# Patient Record
Sex: FEMALE | Race: BLACK OR AFRICAN AMERICAN | NOT HISPANIC OR LATINO | Employment: FULL TIME | URBAN - METROPOLITAN AREA
[De-identification: names, ages, dates, MRNs, and addresses within clinical notes are randomized per-mention and may not be internally consistent; named-entity substitution may affect disease eponyms.]

---

## 2024-11-07 ENCOUNTER — PATIENT MESSAGE (OUTPATIENT)
Dept: FAMILY MEDICINE | Facility: CLINIC | Age: 24
End: 2024-11-07

## 2024-11-07 ENCOUNTER — OFFICE VISIT (OUTPATIENT)
Dept: FAMILY MEDICINE | Facility: CLINIC | Age: 24
End: 2024-11-07
Payer: COMMERCIAL

## 2024-11-07 ENCOUNTER — PATIENT MESSAGE (OUTPATIENT)
Dept: PSYCHIATRY | Facility: CLINIC | Age: 24
End: 2024-11-07
Payer: COMMERCIAL

## 2024-11-07 ENCOUNTER — PATIENT OUTREACH (OUTPATIENT)
Dept: PSYCHIATRY | Facility: CLINIC | Age: 24
End: 2024-11-07
Payer: COMMERCIAL

## 2024-11-07 VITALS
BODY MASS INDEX: 28.12 KG/M2 | SYSTOLIC BLOOD PRESSURE: 112 MMHG | OXYGEN SATURATION: 97 % | HEART RATE: 97 BPM | WEIGHT: 164.69 LBS | DIASTOLIC BLOOD PRESSURE: 78 MMHG | HEIGHT: 64 IN

## 2024-11-07 DIAGNOSIS — Z86.59 HISTORY OF ADHD: ICD-10-CM

## 2024-11-07 DIAGNOSIS — G89.29 CHRONIC INTRACTABLE HEADACHE, UNSPECIFIED HEADACHE TYPE: ICD-10-CM

## 2024-11-07 DIAGNOSIS — K76.0 FATTY LIVER: ICD-10-CM

## 2024-11-07 DIAGNOSIS — D50.9 MICROCYTIC ANEMIA: ICD-10-CM

## 2024-11-07 DIAGNOSIS — R51.9 CHRONIC INTRACTABLE HEADACHE, UNSPECIFIED HEADACHE TYPE: ICD-10-CM

## 2024-11-07 DIAGNOSIS — Z13.220 SCREENING FOR LIPID DISORDERS: ICD-10-CM

## 2024-11-07 DIAGNOSIS — R82.90 ABNORMAL URINALYSIS: ICD-10-CM

## 2024-11-07 DIAGNOSIS — N92.6 IRREGULAR MENSTRUAL CYCLE: ICD-10-CM

## 2024-11-07 DIAGNOSIS — Z76.89 ENCOUNTER TO ESTABLISH CARE WITH NEW DOCTOR: Primary | ICD-10-CM

## 2024-11-07 DIAGNOSIS — E66.3 OVERWEIGHT WITH BODY MASS INDEX (BMI) OF 28 TO 28.9 IN ADULT: ICD-10-CM

## 2024-11-07 LAB
BILIRUB UR QL STRIP: NEGATIVE
CLARITY UR: CLEAR
COLOR UR: YELLOW
GLUCOSE UR QL STRIP: NEGATIVE
HGB UR QL STRIP: NEGATIVE
KETONES UR QL STRIP: NEGATIVE
LEUKOCYTE ESTERASE UR QL STRIP: NEGATIVE
NITRITE UR QL STRIP: NEGATIVE
PH UR STRIP: 8 [PH] (ref 5–8)
PROT UR QL STRIP: NEGATIVE
SP GR UR STRIP: 1.02 (ref 1–1.03)
URN SPEC COLLECT METH UR: NORMAL

## 2024-11-07 PROCEDURE — 3008F BODY MASS INDEX DOCD: CPT | Mod: CPTII,S$GLB,, | Performed by: STUDENT IN AN ORGANIZED HEALTH CARE EDUCATION/TRAINING PROGRAM

## 2024-11-07 PROCEDURE — 81003 URINALYSIS AUTO W/O SCOPE: CPT | Mod: PO | Performed by: STUDENT IN AN ORGANIZED HEALTH CARE EDUCATION/TRAINING PROGRAM

## 2024-11-07 PROCEDURE — 99999 PR PBB SHADOW E&M-EST. PATIENT-LVL IV: CPT | Mod: PBBFAC,,, | Performed by: STUDENT IN AN ORGANIZED HEALTH CARE EDUCATION/TRAINING PROGRAM

## 2024-11-07 PROCEDURE — 1159F MED LIST DOCD IN RCRD: CPT | Mod: CPTII,S$GLB,, | Performed by: STUDENT IN AN ORGANIZED HEALTH CARE EDUCATION/TRAINING PROGRAM

## 2024-11-07 PROCEDURE — 1160F RVW MEDS BY RX/DR IN RCRD: CPT | Mod: CPTII,S$GLB,, | Performed by: STUDENT IN AN ORGANIZED HEALTH CARE EDUCATION/TRAINING PROGRAM

## 2024-11-07 PROCEDURE — 99204 OFFICE O/P NEW MOD 45 MIN: CPT | Mod: S$GLB,,, | Performed by: STUDENT IN AN ORGANIZED HEALTH CARE EDUCATION/TRAINING PROGRAM

## 2024-11-07 PROCEDURE — 3078F DIAST BP <80 MM HG: CPT | Mod: CPTII,S$GLB,, | Performed by: STUDENT IN AN ORGANIZED HEALTH CARE EDUCATION/TRAINING PROGRAM

## 2024-11-07 PROCEDURE — 3074F SYST BP LT 130 MM HG: CPT | Mod: CPTII,S$GLB,, | Performed by: STUDENT IN AN ORGANIZED HEALTH CARE EDUCATION/TRAINING PROGRAM

## 2024-11-07 RX ORDER — AMOXICILLIN AND CLAVULANATE POTASSIUM 875; 125 MG/1; MG/1
1 TABLET, FILM COATED ORAL 2 TIMES DAILY
COMMUNITY
Start: 2024-10-23

## 2024-11-07 NOTE — PROGRESS NOTES
"Patient ID: Mela Monae is a 24 y.o. female.    Chief Complaint: Annual Exam    History of Present Illness    CHIEF COMPLAINT:  24 year old female presents to [facility] care. She is new to me and new to this clinic. Her main concerns today are weight loss difficulties, ADHD symptoms, and chronic headaches.    WEIGHT MANAGEMENT:  She reports difficulty losing weight despite maintaining a healthy lifestyle as a , including regular exercise and healthy eating habits. She expresses frustration with her inability to lose weight and uncertainty about the cause. She feels heavy and notes that her clothes, particularly jeans, are fitting tightly. She compares her current experience to her postpartum weight loss after her first child, indicating she was previously able to regain her pre-pregnancy figure through exercise. Her goal weight is 150 pounds. She has resorted to purchasing new clothes due to the tight fit of her existing wardrobe. She works as a . She has tried four different diets but not the Mediterranean diet.    ADHD:  She reports a history of ADHD symptoms, initially evaluated in high school. At that time, she was able to maintain academic performance and chose to cope without intervention. However, she now experiences increasing difficulty managing symptoms, particularly with the added responsibilities of children and ongoing college education. She describes persistent irritability and racing thoughts, characterizing her mental state as "hyperdrive" with constant mental "noise." She reports difficulty with task initiation, noting that when overwhelmed with thoughts, she becomes immobilized and unproductive for extended periods, unable to prioritize or organize her thoughts into action.    CHRONIC HEADACHES:  She reports chronic daily headaches that began approximately nine months ago, following the birth of her child. She experiences headaches throughout the entire day, with " notable occurrences while driving, at home, and at night. She had a previous MRI, which came back clear. She notes that the headaches are worse at specific times, particularly when driving long distances. For treatment, she takes Tylenol 1000 mg every 3-4 days as needed, avoiding frequent use. She previously used ibuprofen but discontinued due to concerns about withdrawal headaches. She reports that caffeine helps alleviate her headaches but usually does not have coffee more often than a cup twice weekly. She denies any significant changes in vision associated with the headaches. She mentions occasional weakness in her hands when screwing bottle tops.    MENSTRUAL ISSUES:  She reports experiencing heavy menstrual bleeding since giving birth 9 months ago. Her cycle is coming 4 days late and is accompanied by severe cramping. She states that this is different from her experience after her first child, when her cycle returned to normal after two months. She is not currently breastfeeding. She follows with her OBGYN for this regularly, Dr. Bowen.    ANEMIA:  She reports a history of severe anemia since childhood. She does not tolerate oral iron supplements well, experiencing vomiting. She underwent intravenous iron treatment, completing two sessions. The last iron treatment was administered in March or April. She indicates that the IV iron therapy was effective in improving her anemia. I see that iron studies are ordered already by Dr. Bowen.    FAMILY HISTORY:  She reports her sister has type 2 diabetes. Her mother is borderline type 2 diabetes and had a full hysterectomy at age 36. Grandmother has high blood pressure and high cholesterol, while her grandfather has diabetes. She denies any family history of breast, colon, or ovarian cancer.    SOCIAL HISTORY:  She is recently  and lives with her  and two children in [place]. 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 She reports consuming alcohol in moderation, typically two glasses of wine per week.    ROS: as above          Vitals:    11/07/24 1320   BP: 112/78   Pulse: 97   SpO2: 97%   Weight: 74.7 kg (164 lb 10.9 oz)   Height: 5' 4" (1.626 m)     Body mass index is 28.27 kg/m².     Physical Exam  HENT:      Right Ear: Tympanic membrane and external ear normal.      Left Ear: Tympanic membrane and external ear normal.      Mouth/Throat:      Mouth: Mucous membranes are moist.      Pharynx: Oropharynx is clear.   Eyes:      Conjunctiva/sclera: Conjunctivae normal.      Pupils: Pupils are equal, round, and reactive to light.   Cardiovascular:      Rate and Rhythm: Normal rate and regular rhythm.      Heart sounds: Normal heart sounds.   Pulmonary:      Effort: Pulmonary effort is normal.      Breath sounds: Normal breath sounds.   Abdominal:      General: Bowel sounds are normal.      Palpations: Abdomen is soft. There is no mass.      Tenderness: There is no abdominal tenderness.   Lymphadenopathy:      Cervical: No cervical adenopathy.   Skin:     General: Skin is warm and dry.      Findings: No lesion.   Neurological:      General: No focal deficit present.      Mental Status: She is alert.      Cranial Nerves: No cranial nerve deficit.      Motor: No weakness.      Comments: No pronator drift. No dysdiadochokinesia. No nystagmus.   Psychiatric:         Mood and Affect: Mood normal.         Behavior: Behavior normal.         Assessment & Plan    Assessed patient's persistent headaches  Evaluated anemia history, noting recent iron studies ordered by another provider  Reviewed recent liver enzymes, which were normal despite previous fatty liver impression on ultrasound imaging during pregnancy  Considered ADHD evaluation based on patient's reported symptoms and history  Considered weight loss options, including GLP-1 agonists, but deferred decision pending further discussion    1. Encounter to establish " care with new doctor    2. Overweight with body mass index (BMI) of 28 to 28.9 in adult  - Recommend 150 minutes of moderate aerobic activity weekly or 75 minutes of vigorous.  - Mediterranean diet recommended and educational print out given.  - Will hold on referral to dietician in this patient who works as a  and nutritionist.  - Provided information on GLP-1 agonists for weight loss, mentioning animal studies showing association with thyroid cancer but no known association in humans.  - Scheduled follow up in 1-2 weeks to discuss weight loss medication options.    3. History of ADHD  - Ambulatory referral/consult to Psychiatry; Future  - If medication is indicated, stimulant medicines may promote appetite suppression.    4. Chronic intractable headache, unspecified headache type  - Ambulatory referral/consult to Neurology; Future  - Has had normal MRI. Denies worsening with lying down, coughing, having a bowel movement.  - Only has caffeine twice weekly.  - Denies snoring.  - Admits driving as a trigger but she just had her eye glasses prescription updated with new astigmatism in the right eye (been chronic in the left eye since high school).  - Admits frequent nausea not necessarily always with a headache.  - Admits feeling like decreased hearing on the left side. Hearing grossly normal on exam. Bilateral canals without cerumen.    5. Fatty liver  - Most recent liver enzymes within normal.  - Ultrasound performed during most recent pregnancy checking for gallstones.    6. Abnormal urinalysis  - Urinalysis  - Repeat when not on period to check that blood has cleared.    7. Microcytic anemia  - CBC Without Differential; Future  - Iron studies already ordered by Dr. Bowen.  - Reports status post iron infusion back in March 2024.    8. Screening for lipid disorders  - LIPID PANEL; Future  - To be collected at the same time she has iron studies drawn to [facility].    9. Irregular menstrual cycle  -  Continue to follow up with Dr. Bowen - work up initiated with ultrasound and iron studies seen ordered in the system.    LABS:  - Ordered urinalysis.    FOLLOW UP:  - Follow up in 1-2 weeks to discuss test results, weight loss.  - Contact the office within 1 week if not scheduled for psychiatry and neurology referrals.        Follow up in about 2 weeks (around 11/21/2024).    This note was generated with the assistance of ambient listening technology. Verbal consent was obtained by the patient and accompanying visitor(s) for the recording of patient appointment to facilitate this note. I attest to having reviewed and edited the generated note for accuracy, though some syntax or spelling errors may persist. Please contact the author of this note for any clarification.

## 2024-11-08 ENCOUNTER — TELEPHONE (OUTPATIENT)
Dept: FAMILY MEDICINE | Facility: CLINIC | Age: 24
End: 2024-11-08
Payer: COMMERCIAL

## 2024-11-08 ENCOUNTER — OFFICE VISIT (OUTPATIENT)
Dept: NEUROLOGY | Facility: CLINIC | Age: 24
End: 2024-11-08
Payer: COMMERCIAL

## 2024-11-08 VITALS
RESPIRATION RATE: 17 BRPM | HEIGHT: 64 IN | WEIGHT: 165.38 LBS | TEMPERATURE: 99 F | SYSTOLIC BLOOD PRESSURE: 128 MMHG | BODY MASS INDEX: 28.24 KG/M2 | DIASTOLIC BLOOD PRESSURE: 80 MMHG | HEART RATE: 72 BPM

## 2024-11-08 DIAGNOSIS — G43.709 CHRONIC MIGRAINE WITHOUT AURA WITHOUT STATUS MIGRAINOSUS, NOT INTRACTABLE: Primary | ICD-10-CM

## 2024-11-08 DIAGNOSIS — G89.29 CHRONIC INTRACTABLE HEADACHE, UNSPECIFIED HEADACHE TYPE: ICD-10-CM

## 2024-11-08 DIAGNOSIS — R51.9 CHRONIC INTRACTABLE HEADACHE, UNSPECIFIED HEADACHE TYPE: ICD-10-CM

## 2024-11-08 PROCEDURE — 99999 PR PBB SHADOW E&M-EST. PATIENT-LVL IV: CPT | Mod: PBBFAC,,, | Performed by: NURSE PRACTITIONER

## 2024-11-08 RX ORDER — PROPRANOLOL HYDROCHLORIDE 10 MG/1
10 TABLET ORAL 2 TIMES DAILY
Qty: 60 TABLET | Refills: 11 | Status: SHIPPED | OUTPATIENT
Start: 2024-11-08 | End: 2025-11-08

## 2024-11-08 RX ORDER — SUMATRIPTAN 50 MG/1
50 TABLET, FILM COATED ORAL
Qty: 10 TABLET | Refills: 11 | Status: SHIPPED | OUTPATIENT
Start: 2024-11-08 | End: 2024-12-08

## 2024-11-08 NOTE — TELEPHONE ENCOUNTER
----- Message from Lilia Marie DO sent at 11/7/2024  5:18 PM CST -----  Patient asked have her name changed in the portal to her new  name. I told her to show the  her ID, but I am not sure if she did. Her new  name is Marianela. Please update her record. Thank you.

## 2024-11-08 NOTE — PROGRESS NOTES
Date of service: 11/8/2024  Referring provider: Dr. Lilia Marie    Subjective:      Chief complaint: Headache       Patient ID: Mela Monae is a 24 y.o. who presents today as a new patient for headache.     History of Present Illness  ORIGINAL HEADACHE HISTORY -   Age at onset and course over time: [age] intermittently prior to pregnancy of her first child. After delivery of second child (01/29/2024) headaches increased in frequency, duration and intensity. Today, she reports daily headache with escalation to migraine roughly 4 times per week. She is typically unable to drive due to frequency of migraine.     Location: frontal, temporal   Quality:  [] Stabbing [x] Pressure [x] Tight [x] Throbbing/pounding [] Sharp    Duration: [] Seconds [] Minutes [x] Hours [] Days [] Constant   Frequency: [x] Daily [] Weekly [] Monthly   How many days per month is your head or neck 100% pain free: 0  Headaches awaken at night?:     Worst time of day: mid-day, evening, overnight   Intensity of pain: at best 2/10, at worst 10/10   Associated with: [x] Photophobia []  Phonophobia [] Osmophobia [] Loss of appetite [x] Nausea [] Vomiting   [x] Dizziness [x] Vertigo [x] Ringing in the ears [] Blurry vision [] Double vision  [x] Anxiety/Anger/Irritability [x] Problems with concentration [x] Problems with memory [x] Problems with task completion   [x] Problems with relaxation [] Neck tightness/ neck pain [] Nasal congestion [x] Nasal or sinus pressure [x] Aura   Alleviated by:  [x] Sleep [x] Darkness [] Local pressure [] Massage [] Heat [] Ice [] Menses [x] Medication  Exacerbated by:  [x] Fatigue [x] Light [x] Noise [] Smells [] Coughing [x] Sneezing  [] Bending over [] Change in weather [] Ovulation [x] Menses [x] Alcohol [x] Stress []  Food  Ipsilateral autonomic: [] nasal congestion [] lacrimation [] ptosis [] injection [] edema [] foreign body sensation [] ear fullness   ICP:  [] transient visual obscurations  [x] tinnitus -  low pitched, steady, left ear   [] positional headache  [] non-positional     Bowl Habits: [] Normal [] Constipation [] Diarrhea   Caffeine intake: 70 mg, 3-4 days per week   Sleep habits: un-refreshed sleep   Water intake: 3 bottles per day    Eye Exam: up to date   Family history of migraine: sister   Gyn status (if female) (birth control with estrogen, hysterectomy): having periods   History of asthma, cancer, glaucoma, kidney stones, CVA and osteoporosis: none     HIT 6: 74    Current acute treatment:  Ibuprofen   Tylenol     Current prevention:  None     Previously tried/failed acute treatment:  None     Previously tried/failed preventative treatment:  None     Considerations:     Review of patient's allergies indicates:  No Known Allergies  Current Outpatient Medications   Medication Sig Dispense Refill    ibuprofen (ADVIL,MOTRIN) 800 MG tablet Take 1 tablet (800 mg total) by mouth every 8 (eight) hours as needed for Pain. 30 tablet 2    amoxicillin-clavulanate 875-125mg (AUGMENTIN) 875-125 mg per tablet Take 1 tablet by mouth 2 (two) times daily. (Patient not taking: Reported on 11/8/2024)      propranoloL (INDERAL) 10 MG tablet Take 1 tablet (10 mg total) by mouth 2 (two) times daily. Ok to repeat dose 2 hours later. No more than 200 mg per 24 hours. 60 tablet 11    sumatriptan (IMITREX) 50 MG tablet Take 1 tablet (50 mg total) by mouth as needed for Migraine. Ok to repeat dose 2 hours later. No more than 200 mg per 24 hours. 10 tablet 11     No current facility-administered medications for this visit.       Past Medical History  History reviewed. No pertinent past medical history.    Past Surgical History  History reviewed. No pertinent surgical history.    Family History  Family History   Problem Relation Name Age of Onset    No Known Problems Mother Mariluz         uterine fibroids; prediabetes    No Known Problems Father Issac Nguyen     Diabetes type II Sister Alisa [place]     Hyperlipidemia Maternal  Grandmother      Hypertension Maternal Grandmother      Diabetes Maternal Grandfather      Breast cancer Neg Hx      Colon cancer Neg Hx      Ovarian cancer Neg Hx         Social History  Social History     Socioeconomic History    Marital status: Single   Occupational History    Occupation: Stay at home mom    Occupation: Studying for Ultrasound Technician   Tobacco Use    Smoking status: Never     Passive exposure: Never    Smokeless tobacco: Never   Substance and Sexual Activity    Alcohol use: Not Currently     Alcohol/week: 2.0 standard drinks of alcohol     Types: 2 Glasses of wine per week     Comment: rare    Drug use: No    Sexual activity: Yes     Partners: Male     Birth control/protection: None     Comment:    Social History Narrative    Lives with  and two children and a pet dog in [place].     Social Drivers of Health     Financial Resource Strain: Medium Risk (11/8/2024)    Overall Financial Resource Strain (CARDIA)     Difficulty of Paying Living Expenses: Somewhat hard   Food Insecurity: Food Insecurity Present (11/8/2024)    Hunger Vital Sign     Worried About Running Out of Food in the Last Year: Sometimes true     Ran Out of Food in the Last Year: Sometimes true   Physical Activity: Insufficiently Active (11/8/2024)    Exercise Vital Sign     Days of Exercise per Week: 2 days     Minutes of Exercise per Session: 30 min   Stress: Stress Concern Present (11/8/2024)    [employer] - Occupational Stress Questionnaire     Feeling of Stress : Very much   Housing Stability: High Risk (11/8/2024)    Housing Stability Vital Sign     Unable to Pay for Housing in the Last Year: Yes        Review of Systems  14-point review of systems as follows:   No check tobin indicates NEGATIVE response   Constitutional: [] weight loss [x] change to appetite   Eyes: [] change in vision [] double vision   Ears, nose, mouth, throat: [] frequent nose bleeds [x] ringing in the ears    Respiratory: [] cough [] wheezing   Cardiovascular: [] chest pain [x] palpitations   Gastrointestinal: [] jaundice [] nausea/vomiting   Genitourinary: [] incontinence [] burning with urination   Hematologic/lymphatic: [] easy bruising/bleeding [] night sweats   Neurological: [] numbness [] weakness   Endocrine: [x] fatigue [] heat/cold intolerance   Allergy/Immunologic: [] fevers [] chills   Musculoskeletal: [x] muscle pain [] joint pain   Psychiatric: [] thoughts of harming self/others [] depression   Integumentary: [] rashes [] sores that do not heal     Objective:        Vitals:    11/08/24 1316   BP: 128/80   Pulse: 72   Resp: 17   Temp: 98.7 °F (37.1 °C)     Body mass index is 28.38 kg/m².    Constitutional: appears in no acute distress, well-developed, well-nourished     Eyes: normal conjunctiva, PERRLA    Ears, nose, mouth, throat: external appearance of ears and nose normal, hearing intact     Cardiovascular: n/a     Respiratory: unlabored respirations    Gastrointestinal: no visible abdominal masses, no guarding, no visible hernia    Musculoskeletal: normal tone in all four extremities. No abnormal movements. No pronator drift. No orbit. Symmetric finger tapping. Normal station. Normal regular gait.       Spine:   CERVICAL SPINE:  ROM: normal   MUSCLE SPASM: no   FACET LOADING: no   SPURLING: no  JERMAINE / KARI tender: no     Psychiatric: normal judgment and insight. Oriented to person, place, and time.     Neurologic:   Cortical functions: recent and remote memory intact, normal attention span and concentration, speech fluent, adequate fund of knowledge   Cranial nerves: visual fields full, PERRLA, EOMI, symmetric facial strength, hearing intact, palate elevates symmetrically, shoulder shrug 5/5, tongue protrudes midline   Reflexes: 2+ in the upper and lower extremities, no Kelley  Sensation: intact to temperature throughout   Coordination: normal finger to nose, tandem gait     Data Review:     I have  personally reviewed the referring provider's notes, labs, & imaging made available to me today.      RADIOLOGY STUDIES:  I have personally reviewed the pertinent images performed.       Results for orders placed or performed during the hospital encounter of 09/17/24   MRI Brain W WO Contrast    Narrative    EXAMINATION:  MRI BRAIN W WO CONTRAST    CLINICAL HISTORY:  Headache, new or worsening, neuro deficit (Age 19-49y);.  Other migraine, not intractable, without status migrainosus    TECHNIQUE:  Multiplanar multisequence MR imaging of the brain was performed before and after the uneventful intravenous administration of 7 mL Gadavist.  Diffusion weighted imaging was performed.  ADC map was generated.    COMPARISON:  None.    FINDINGS:  Intracranial compartment:    There is no acute intracranial abnormality.  Brain volume, ventricular size and position are normal.  There is no hemorrhage or mass/mass effect.  There are no definite regions of abnormal signal intensity in the brain.  There are no regions of restricted diffusion to suggest acute infarction.  There is no pathologic enhancement.  The basilar cisterns are open.  There is no abnormal extra-axial fluid collection.  Flow voids indicating patency are present in the major vessels at the base of the brain.  The cerebellar tonsils are in normal position.  The sellar structures are normal.  The orbits are grossly normal.    Skull/extracranial contents: Marrow signal intensity in the clivus and calvarium is grossly normal.  The included paranasal sinuses and mastoid air cells are clear.  The included facial soft tissues and scalp are normal.      Impression    1.  Normal imaging of the brain.  There is no acute abnormality.  There is no hemorrhage, mass/mass effect, acute infarction.  There is no pathologic enhancement.      Electronically signed by: Elias Houser MD  Date:    09/17/2024  Time:    13:49       Lab Results   Component Value Date      03/06/2024    K 3.7 03/06/2024     03/06/2024    CO2 28 03/06/2024    BUN 8 03/06/2024    CREATININE 0.8 03/06/2024    GLU 72 03/06/2024    AST 26 03/06/2024    ALT 19 03/06/2024    ALBUMIN 4.0 03/06/2024    PROT 7.7 03/06/2024    BILITOT 0.4 03/06/2024       Lab Results   Component Value Date    WBC 5.06 03/06/2024    HGB 10.4 (L) 03/06/2024    HCT 36.6 (L) 03/06/2024    MCV 73 (L) 03/06/2024     03/06/2024       Lab Results   Component Value Date    TSH 1.460 03/24/2023           Assessment & Plan:       Problem List Items Addressed This Visit       Chronic intractable headache    Chronic migraine without aura without status migrainosus, not intractable - Primary    Overview     Headaches are typically moderate to severe in intensity, worsen with activity, pounding in quality and associated with sensitivity to light and nausea.     Recent MRI Brain unremarkable.      Start Propranolol 10 mg nightly with increase to twice daily after 3-4 nights. Consider titration of dose in the future. Monitor heart rate and blood pressure at home. Hold medication for heart rate less than 60, systolic blood pressure less than 90 or if symptomatic. Start Magnesium nightly. Consider Topamax, Effexor, monoclonal +/- Botox. Start Imitrex 50 mg as needed. Ok to repeat dose 2 hours later. No more than 200 mg per 24 hours. Consider alternative triptain then CGRP in the future. Increase daily water intake. Headache journal.            Relevant Medications    propranoloL (INDERAL) 10 MG tablet    sumatriptan (IMITREX) 50 MG tablet           Please call our clinic at [phone] or send a message on the [facility] portal if there are any changes to the plan described below, for example,if you are not contacted for the requested tests, referral(s) within one week, if you are unable to receive the medications prescribed, or if you feel you need to change the treatment course for any reason.     TESTING:  none     REFERRALS: none      PREVENTION (use daily regardless of headache):  - Start Magnesium in ONE of the following preparations -               1. Magnesium oxide 800mg daily (the most common over the counter kind, may causes loose stools)              2. Magnesium citrate 400-500mg daily (harder to find, but more neutral on the bowels)              3. Magnesium glycinate 400mg daily (hardest to find, look online, but most bowel-neutral, best absorbed)   - Start Propranolol 10 mg nightly with increase to twice daily after 3-4 nights. Consider titration of dose in the future. Monitor heart rate and blood pressure at home. Hold medication for heart rate less than 60, systolic blood pressure less than 90 or if symptomatic.     - Consider Topamax, Effexor, monoclonal +/- Botox     AS-NEEDED TREATMENT (use total no more than 10 days per month unless otherwise stated):  - Start Imitrex 50 mg as needed. Ok to repeat dose 2 hours later. No more than 200 mg per 24 hours.   - Consider alternative triptain then CGRP in the future     OTHER:   - Increase daily water intake   - Headache journal       No follow-ups on file.       MONTY BenitoC      I have spent 60 minutes of total time on the total encounter which includes face to face time and non-face to face time preparing to see the patient (eg, review of labs, previous encounters, care everywhere), obtaining and/or reviewing separately obtained history, documenting clinical information in the electronic or health record, independently interpreting results, and communicating results to the patient/family/caregiver, or care coordination.

## 2024-11-11 ENCOUNTER — LAB VISIT (OUTPATIENT)
Dept: LAB | Facility: HOSPITAL | Age: 24
End: 2024-11-11
Attending: STUDENT IN AN ORGANIZED HEALTH CARE EDUCATION/TRAINING PROGRAM
Payer: COMMERCIAL

## 2024-11-11 DIAGNOSIS — Z13.220 SCREENING FOR LIPID DISORDERS: ICD-10-CM

## 2024-11-11 DIAGNOSIS — N93.9 ABNORMAL UTERINE BLEEDING (AUB): ICD-10-CM

## 2024-11-11 DIAGNOSIS — D50.9 MICROCYTIC ANEMIA: ICD-10-CM

## 2024-11-11 LAB
BASOPHILS # BLD AUTO: 0.02 K/UL (ref 0–0.2)
BASOPHILS NFR BLD: 0.3 % (ref 0–1.9)
CHOLEST SERPL-MCNC: 210 MG/DL (ref 120–199)
CHOLEST/HDLC SERPL: 3.3 {RATIO} (ref 2–5)
DIFFERENTIAL METHOD BLD: ABNORMAL
EOSINOPHIL # BLD AUTO: 0 K/UL (ref 0–0.5)
EOSINOPHIL NFR BLD: 0.4 % (ref 0–8)
ERYTHROCYTE [DISTWIDTH] IN BLOOD BY AUTOMATED COUNT: 16.5 % (ref 11.5–14.5)
ERYTHROCYTE [DISTWIDTH] IN BLOOD BY AUTOMATED COUNT: 16.5 % (ref 11.5–14.5)
HCT VFR BLD AUTO: 36.7 % (ref 37–48.5)
HCT VFR BLD AUTO: 36.7 % (ref 37–48.5)
HDLC SERPL-MCNC: 64 MG/DL (ref 40–75)
HDLC SERPL: 30.5 % (ref 20–50)
HGB BLD-MCNC: 11.4 G/DL (ref 12–16)
HGB BLD-MCNC: 11.4 G/DL (ref 12–16)
IMM GRANULOCYTES # BLD AUTO: 0.02 K/UL (ref 0–0.04)
IMM GRANULOCYTES NFR BLD AUTO: 0.3 % (ref 0–0.5)
IRON SERPL-MCNC: 19 UG/DL (ref 30–160)
LDLC SERPL CALC-MCNC: 128.8 MG/DL (ref 63–159)
LYMPHOCYTES # BLD AUTO: 2.4 K/UL (ref 1–4.8)
LYMPHOCYTES NFR BLD: 35.1 % (ref 18–48)
MCH RBC QN AUTO: 25.5 PG (ref 27–31)
MCH RBC QN AUTO: 25.5 PG (ref 27–31)
MCHC RBC AUTO-ENTMCNC: 31.1 G/DL (ref 32–36)
MCHC RBC AUTO-ENTMCNC: 31.1 G/DL (ref 32–36)
MCV RBC AUTO: 82 FL (ref 82–98)
MCV RBC AUTO: 82 FL (ref 82–98)
MONOCYTES # BLD AUTO: 0.7 K/UL (ref 0.3–1)
MONOCYTES NFR BLD: 9.8 % (ref 4–15)
NEUTROPHILS # BLD AUTO: 3.7 K/UL (ref 1.8–7.7)
NEUTROPHILS NFR BLD: 54.1 % (ref 38–73)
NONHDLC SERPL-MCNC: 146 MG/DL
NRBC BLD-RTO: 0 /100 WBC
PLATELET # BLD AUTO: 362 K/UL (ref 150–450)
PLATELET # BLD AUTO: 362 K/UL (ref 150–450)
PMV BLD AUTO: 9.6 FL (ref 9.2–12.9)
PMV BLD AUTO: 9.6 FL (ref 9.2–12.9)
RBC # BLD AUTO: 4.47 M/UL (ref 4–5.4)
RBC # BLD AUTO: 4.47 M/UL (ref 4–5.4)
SATURATED IRON: 5 % (ref 20–50)
TOTAL IRON BINDING CAPACITY: 401 UG/DL (ref 250–450)
TRANSFERRIN SERPL-MCNC: 271 MG/DL (ref 200–375)
TRIGL SERPL-MCNC: 86 MG/DL (ref 30–150)
TSH SERPL DL<=0.005 MIU/L-ACNC: 1.07 UIU/ML (ref 0.4–4)
WBC # BLD AUTO: 6.75 K/UL (ref 3.9–12.7)
WBC # BLD AUTO: 6.75 K/UL (ref 3.9–12.7)

## 2024-11-11 PROCEDURE — 83540 ASSAY OF IRON: CPT | Performed by: OBSTETRICS & GYNECOLOGY

## 2024-11-11 PROCEDURE — 80061 LIPID PANEL: CPT | Performed by: STUDENT IN AN ORGANIZED HEALTH CARE EDUCATION/TRAINING PROGRAM

## 2024-11-11 PROCEDURE — 36415 COLL VENOUS BLD VENIPUNCTURE: CPT | Mod: PO | Performed by: OBSTETRICS & GYNECOLOGY

## 2024-11-11 PROCEDURE — 85025 COMPLETE CBC W/AUTO DIFF WBC: CPT | Performed by: OBSTETRICS & GYNECOLOGY

## 2024-11-11 PROCEDURE — 84443 ASSAY THYROID STIM HORMONE: CPT | Performed by: OBSTETRICS & GYNECOLOGY

## 2024-11-12 ENCOUNTER — PATIENT MESSAGE (OUTPATIENT)
Dept: OBSTETRICS AND GYNECOLOGY | Facility: HOSPITAL | Age: 24
End: 2024-11-12
Payer: COMMERCIAL

## 2024-11-12 ENCOUNTER — OFFICE VISIT (OUTPATIENT)
Dept: FAMILY MEDICINE | Facility: CLINIC | Age: 24
End: 2024-11-12
Payer: MEDICAID

## 2024-11-12 ENCOUNTER — PATIENT MESSAGE (OUTPATIENT)
Dept: FAMILY MEDICINE | Facility: CLINIC | Age: 24
End: 2024-11-12
Payer: COMMERCIAL

## 2024-11-12 DIAGNOSIS — E66.3 OVERWEIGHT WITH BODY MASS INDEX (BMI) OF 28 TO 28.9 IN ADULT: Primary | ICD-10-CM

## 2024-11-12 DIAGNOSIS — G43.709 CHRONIC MIGRAINE WITHOUT AURA WITHOUT STATUS MIGRAINOSUS, NOT INTRACTABLE: ICD-10-CM

## 2024-11-12 DIAGNOSIS — E78.2 MIXED HYPERLIPIDEMIA: ICD-10-CM

## 2024-11-12 PROCEDURE — 1160F RVW MEDS BY RX/DR IN RCRD: CPT | Mod: CPTII,95,, | Performed by: STUDENT IN AN ORGANIZED HEALTH CARE EDUCATION/TRAINING PROGRAM

## 2024-11-12 PROCEDURE — 1159F MED LIST DOCD IN RCRD: CPT | Mod: CPTII,95,, | Performed by: STUDENT IN AN ORGANIZED HEALTH CARE EDUCATION/TRAINING PROGRAM

## 2024-11-12 PROCEDURE — G2211 COMPLEX E/M VISIT ADD ON: HCPCS | Mod: 95,,, | Performed by: STUDENT IN AN ORGANIZED HEALTH CARE EDUCATION/TRAINING PROGRAM

## 2024-11-12 PROCEDURE — 99214 OFFICE O/P EST MOD 30 MIN: CPT | Mod: 95,,, | Performed by: STUDENT IN AN ORGANIZED HEALTH CARE EDUCATION/TRAINING PROGRAM

## 2024-11-12 RX ORDER — FERROUS SULFATE 325(65) MG
325 TABLET, DELAYED RELEASE (ENTERIC COATED) ORAL EVERY OTHER DAY
Qty: 60 TABLET | Refills: 3 | Status: SHIPPED | OUTPATIENT
Start: 2024-11-12

## 2024-11-14 DIAGNOSIS — E66.3 OVERWEIGHT WITH BODY MASS INDEX (BMI) OF 28 TO 28.9 IN ADULT: ICD-10-CM

## 2024-11-15 NOTE — TELEPHONE ENCOUNTER
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 and that she understands why would would be apprehensive about sending in Rx for vials of Rx to be drawn up by pt    Pt stated she wanted more info on what to do next   Advised Pt we did send a message advising of the best next step would be to reach compound clinic or reach out to [facility] which will provide Rx at a discount cost and if she goes to weight watchers she will also get a meal plan as well as Rx    Pt was not happy that we cannot bring her in for education on how to give this Rx to herself and I advised since we are not a compound clinic that supplies the medication so, to my knowledge we wouldn't be able to do the education for her but I would be able to offer her apt with either PCP or NP to discuss her concerns since we were discussing this matter for more than 7 minutes. Pt got upset that I was unable to offer more info on the phone call and stated "if you did not have the time to call me back, you should have waited until you had the time" advised Pt typically a discussion like this, and at this length should be at least a VV. Pt declined apt to discuss this matter.     Pt became upset that I offered apt to discuss this further with her care team  I stated she could express what she would like to but unfortunately I am just relaying the info about compound clinics as well as [facility], that since we are not a compound clinic we would not be able to give her the info she is looking for since we are not sure how those clinics handle these types of apts.     Pt stated she did not like my verbiage of "unfortunately I have " "exhausted my knowledge on the matter but like I advised earlier we can schedule apt to discuss this matter with a provider"    Pt stated that "you are in the wrong field if you cannot spend the time to explain to me what is going on further and using verbiage that you have been using" advised Pt I am sorry but those our the best options to get the answers she is looking for     Pt stated she will be filing a complaint on me and disconnected the call    "

## 2024-11-15 NOTE — TELEPHONE ENCOUNTER
Replied to portal message and denied Rx request for vials of the tirzepatide  Advised Pt in portal message to reach out to compound clinic or [facility] for discounted justin of Rx

## 2024-11-19 ENCOUNTER — PATIENT MESSAGE (OUTPATIENT)
Dept: OBSTETRICS AND GYNECOLOGY | Facility: CLINIC | Age: 24
End: 2024-11-19
Payer: MEDICAID

## 2024-11-20 ENCOUNTER — OFFICE VISIT (OUTPATIENT)
Dept: PSYCHIATRY | Facility: CLINIC | Age: 24
End: 2024-11-20
Payer: MEDICAID

## 2024-11-20 ENCOUNTER — PATIENT MESSAGE (OUTPATIENT)
Dept: PSYCHIATRY | Facility: CLINIC | Age: 24
End: 2024-11-20
Payer: MEDICAID

## 2024-11-20 DIAGNOSIS — Z13.39 ADHD (ATTENTION DEFICIT HYPERACTIVITY DISORDER) EVALUATION: Primary | ICD-10-CM

## 2024-11-20 DIAGNOSIS — Z86.59 HISTORY OF ADHD: ICD-10-CM

## 2024-11-20 NOTE — PROGRESS NOTES
The patient location is: [place], [place]  The chief complaint leading to consultation is: ADHD, ADRIAN  Visit type: Virtual visit with synchronous audio and video  Each patient to whom he or she provides medical services by telemedicine is:  (1) informed of the relationship between the physician and patient and the respective role of any other health care provider with respect to management of the patient; and (2) notified that he or she may decline to receive medical services by telemedicine and may withdraw from such care at any time.  Face-to-Face time: 41 minutes    Notes:      Outpatient Psychiatric Initial Visit  11/20/2024     ID:   Patient presents for an initial evaluation.      Reason for encounter: Referral from Dr. Marie     Chief Complaint: ADHD evaluation, ADRIAN    History of Presenting Illness:  Pt described a happy childhood raised by her mother and with her sister. Pt excelled academically and did well socially although often changing friend groups. Pt explained that in HS years she started struggling with anxiety and panic attacks related to her school work. Pt was in AP classes and extra curriculars and was overwhelmed. Pt started counseling and found this helpful.    Pt went to Counts include 234 beds at the [facility] and then [facility]. Pt was struggling with discipline and was doing poorly academically. Pt said that this significantly impacted her self-esteem and she spiraled with depression. Pt was on Lexapro and Buspar for a short time.     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 Pt  and had two children and is a stay-at-home mother of two now. Pt is unhappy with this and so is back in school full-time at [employer]. Pt is struggling with attention/concentration and comes for an ADHD evaluation. Pt is in individual and couple's counseling, as well.    Depression symptoms: pt denied current depression     Anxiety symptoms: Pt reported lifelong chronic worry. Pt described excessive, unproductive  worry that is difficult to control. Pt explained that worrying about things outside of locust of control and worst case scenarios and described this worry as ruminative consistent with generalized anxiety disorder. Pt used to get panic attacks in HS but not lately. Pt denied symptoms consistent with OCD, phobias, or social anxiety.     Beulah/Hypomania Symptoms: Pt denied current or history of related symptoms.    Psychosis Symptoms: Pt denied current or history of related symptoms.    Attention/Concentration Symptoms: Pt reports attention/concentration concerns. Pt explained that her symptoms were present before the age of 11 and are cause impairment in more than one setting. Pt endorsed the following symptoms:    Inattentive:  Often fails to give close attention to details or makes careless mistakes in schoolwork, at work, or with other activities.  Often has trouble holding attention on tasks or play activities.  Often does not seem to listen when spoken to directly.  Often does not follow through on instructions and fails to finish schoolwork, chores, or duties in the workplace (e.g., loses focus, side-tracked).  Often has trouble organizing tasks and activities.  Often avoids, dislikes, or is reluctant to do tasks that require mental effort over a long period of time (such as schoolwork or homework).  Often loses things necessary for tasks and activities (e.g. school materials, pencils, books, tools, wallets, keys, paperwork, eyeglasses, mobile telephones).  Is often easily distracted  Hyperactive  Often unable to play or take part in leisure activities quietly.  Is often on the go acting as if driven by a motor.  Often talks excessively.  Often blurts out an answer before a question has been completed.  Often interrupts or intrudes on others (e.g., butts into conversations or games)    Disordered Eating/Body Image Concerns: Pt denied current or history of related symptoms.    Suicidal Ideation and Risk: Pt  denied current or history of related symptoms.    Homicidal/Violent Ideation and Risk: Pt denied current or history of related symptoms.    Criminal History: Pt denied.    Prior Psychiatric Treatment/Hospitalizations: Pt denied.     Current psychiatric medication: none    Prior psychiatric medication trials: Lexapro and Buspar    Current Medical Conditions Per Chart Review:   Patient Active Problem List   Diagnosis    Fetal renal anomaly, single gestation    Iron deficiency anemia, unspecified    Fatty liver    Abnormal urinalysis    Chronic intractable headache    History of ADHD    Chronic migraine without aura without status migrainosus, not intractable      Family Psychiatric History:  unclear    Alcohol Use: Pt reported minimal, infrequent alcohol use and denied a history of problematic drinking.    Tobacco and Drug Use: Pt denied tobacco or drug use.     Social History:  Pt is  with two children (3 and 9 months). Pt stays at home and is a full-time student at [employer]. Pt has not been exercising lately but eats healthy. Pt drinks minimally and infrequently. Pt denied cigarette and drug use.     Trauma history:  pt denied     Mental Status Exam      Physical Exam  Psychiatric:         Attention and Perception: Attention normal.         Mood and Affect: Mood is anxious.         Speech: Speech normal.         Behavior: Behavior normal. Behavior is cooperative.         Thought Content: Thought content normal. Thought content is not paranoid or delusional. Thought content does not include homicidal or suicidal ideation. Thought content does not include homicidal or suicidal plan.         Cognition and Memory: Cognition and memory normal.         Judgment: Judgment normal.      Comments: General appearance:  casually groomed, casually dressed    Behavior:  calm, engaged    Demeanor:  pleasant, cooperative    Mood:  anxious   Affect:  euthymic   Speech:  regular rate, tone and volume    Thought Process:  linear  and goal directed    Thought Content:  appropriate - absent of aggressive or self injurious thoughts, feelings or impulses    Insight into Current Situation:  fair    Judgement: fair   Expected Ability to Adhere to Treatment plan: good        Current Evaluation:  Nutritional Screening:  Considering the patient's height and weight, medications, medical history and preferences, should a referral be made to the dietitian? No  Vitals: most recent vitals signs, dated greater than 90 days prior to this appointment, were reviewed  General: age appropriate, well nourished, casually dressed, neatly groomed  MSK: muscle strength/tone : no tremor or abnormal movements. Gait/Station: no ataxic, steady    Clinical Assessment :     Pt reported lifelong chronic worry. Pt described excessive, unproductive worry that is difficult to control. Pt explained that worrying about things outside of locust of control and worst case scenarios and described this worry as ruminative consistent with generalized anxiety disorder. Pt reports attention/concentration concerns. Pt explained that her symptoms were present before the age of 11 and are cause impairment in more than one setting. Will refer to ADHD testing to confirm a diagnosis.     Diagnosis(es):   1) Generalized Anxiety Disorder    Plan      Goal #1: Improve mood  Goal #2: Improve attention/concentration    Pt is to follow through on ADHD testing.    Treatment plan and medication changes will be coordinated with PCP, Dr. Marie    This author reviewed limits to confidentiality and this author's collaboration with pt's physician. Pt indicated understanding and denied any questions.    Return to Clinic: after ADHD testing    -Call to report any worsening of symptoms or problems associated with medication  - Pt instructed to go to ER if thoughts of harming self or others arise   Spent 45 minutes face-to-face with patient during evaluation.    -Supportive therapy and psychoeducation  provided  -R/B/SE's of medications discussed with the pt who expresses understanding and chooses to take medications as prescribed.   -Pt instructed to call clinic, 911 or go to nearest emergency room if sxs worsen or pt is in   crisis. The pt expresses understanding.

## 2024-11-21 ENCOUNTER — HOSPITAL ENCOUNTER (OUTPATIENT)
Dept: RADIOLOGY | Facility: HOSPITAL | Age: 24
Discharge: HOME | End: 2024-11-21
Attending: OBSTETRICS & GYNECOLOGY
Payer: MEDICAID

## 2024-11-21 ENCOUNTER — PATIENT MESSAGE (OUTPATIENT)
Dept: OBSTETRICS AND GYNECOLOGY | Facility: CLINIC | Age: 24
End: 2024-11-21
Payer: MEDICAID

## 2024-11-21 DIAGNOSIS — N93.9 ABNORMAL UTERINE BLEEDING (AUB): ICD-10-CM

## 2024-11-21 PROCEDURE — 76856 US EXAM PELVIC COMPLETE: CPT | Mod: 26,,, | Performed by: RADIOLOGY

## 2024-11-21 PROCEDURE — 76830 TRANSVAGINAL US NON-OB: CPT | Mod: TC,PO

## 2024-11-21 PROCEDURE — 76830 TRANSVAGINAL US NON-OB: CPT | Mod: 26,,, | Performed by: RADIOLOGY

## 2024-11-21 NOTE — PROGRESS NOTES
Subjective:       Patient ID: Mela Bear is a 24 y.o. female.    Chief Complaint: No chief complaint on file.    The patient location is: [place]  The chief complaint leading to consultation is: test results, weight loss    Visit type: audiovisual    Face to Face time with patient: 15  15 minutes of total time spent on the encounter, which includes face to face time and non-face to face time preparing to see the patient (eg, review of tests), Obtaining and/or reviewing separately obtained history, Documenting clinical information in the electronic or other health record, Independently interpreting results (not separately reported) and communicating results to the patient/family/caregiver, or Care coordination (not separately reported).         Each patient to whom he or she provides medical services by telemedicine is:  (1) informed of the relationship between the physician and patient and the respective role of any other health care provider with respect to management of the patient; and (2) notified that he or she may decline to receive medical services by telemedicine and may withdraw from such care at any time.    Notes:     24 year old female known to me last seen two weeks ago to establish care. She presents for follow up of test results and discussion of weight loss medications. There is no known family history of thyroid cancer or cancer syndrome (MEN). There is no known personal history of seizure, thoughts of self harm or bola. The patient does not take opioid medications. The patient has established with Neurology, Felipa Roblero NP and was prescribed propranolol which she will try for migraines as well as sumatriptan. She will follow with her OBGYN, Dr. Bowen for iron deficiency anemia. Lipid panel revealed mildly increased total cholesterol and optimal HDL, triglycerides, and LDL. Thyroid function was normal. Urinalysis 11/7/24 was benign and negative for blood (2/9/24 urinalysis had 2+ blood but  indication for testing is unknown and whether patient was on her cycle). Patient will work on increasing physical activity to goal of 150 minutes a week - she has been discouraged. She will continue to focus on a healthy diet. We discussed medications including GLP-1 receptor agonists (eg. Tirzepatide), phentermine-topiramate, naltrexone-bupropion, phentermine, and orlistat. The patient is concerned about risk of tolerance/abuse with stimulant medications and would prefer to try Tirzepatide with second line to consider naltrexone-bupropion should medication be cost prohibitive. She will update me on whether she is able to fill the medicine or would like to try another. If she should start Tirzepatide she will plan to follow up in one month to consider medication titration - a virtual visit would be reasonable. She should use birth control (eg. condom) to avoid pregnancy while taking this medicine. She verbalizes understanding and agrees with the plan that this medication may be associated with birth defects and should be discontinued if she may be pregnant.         History reviewed. No pertinent past medical history.    History reviewed. No pertinent surgical history.    Review of patient's allergies indicates:  No Known Allergies    Social History     Socioeconomic History    Marital status: Single   Occupational History    Occupation: Stay at home mom    Occupation: Studying for [employer]   Tobacco Use    Smoking status: Never     Passive exposure: Never    Smokeless tobacco: Never   Substance and Sexual Activity    Alcohol use: Not Currently     Alcohol/week: 2.0 standard drinks of alcohol     Types: 2 Glasses of wine per week     Comment: rare    Drug use: No    Sexual activity: Yes     Partners: Male     Birth control/protection: None     Comment:    Social History Narrative    Lives with  and two children and a pet dog in [place].     Social Drivers of Health     Financial Resource  Strain: Medium Risk (11/8/2024)    Overall Financial Resource Strain (CARDIA)     Difficulty of Paying Living Expenses: Somewhat hard   Food Insecurity: Food Insecurity Present (11/8/2024)    Hunger Vital Sign     Worried About Running Out of Food in the Last Year: Sometimes true     Ran Out of Food in the Last Year: Sometimes true   Physical Activity: Insufficiently Active (11/8/2024)    Exercise Vital Sign     Days of Exercise per Week: 2 days     Minutes of Exercise per Session: 30 min   Stress: Stress Concern Present (11/8/2024)    [employer] [place] [employer] - Occupational Stress Questionnaire     Feeling of Stress : Very much   Housing Stability: High Risk (11/8/2024)    Housing Stability Vital Sign     Unable to Pay for Housing in the Last Year: Yes       Current Outpatient Medications on File Prior to Visit   Medication Sig Dispense Refill    amoxicillin-clavulanate 875-125mg (AUGMENTIN) 875-125 mg per tablet Take 1 tablet by mouth 2 (two) times daily. (Patient not taking: Reported on 11/8/2024)      ibuprofen (ADVIL,MOTRIN) 800 MG tablet Take 1 tablet (800 mg total) by mouth every 8 (eight) hours as needed for Pain. 30 tablet 2    propranoloL (INDERAL) 10 MG tablet Take 1 tablet (10 mg total) by mouth 2 (two) times daily. Ok to repeat dose 2 hours later. No more than 200 mg per 24 hours. 60 tablet 11    sumatriptan (IMITREX) 50 MG tablet Take 1 tablet (50 mg total) by mouth as needed for Migraine. Ok to repeat dose 2 hours later. No more than 200 mg per 24 hours. 10 tablet 11     No current facility-administered medications on file prior to visit.       Family History   Problem Relation Name Age of Onset    No Known Problems Mother Qumariaelena         uterine fibroids; prediabetes    No Known Problems Father Issac Nguyen     Diabetes type II Sister Alisa Nguyen     Hyperlipidemia Maternal Grandmother      Hypertension Maternal Grandmother      Diabetes Maternal Grandfather      Breast cancer Neg Hx       Colon cancer Neg Hx      Ovarian cancer Neg Hx         Review of Systems   Constitutional:  Positive for unexpected weight change. Negative for activity change.   HENT:  Negative for hearing loss, rhinorrhea and trouble swallowing.    Eyes:  Negative for discharge and visual disturbance.   Respiratory:  Negative for chest tightness and wheezing.    Cardiovascular:  Negative for chest pain and palpitations.   Gastrointestinal:  Positive for constipation. Negative for blood in stool, diarrhea and vomiting.   Endocrine: Negative for polydipsia and polyuria.   Genitourinary:  Positive for menstrual problem. Negative for difficulty urinating, dysuria and hematuria.   Musculoskeletal:  Negative for arthralgias, joint swelling and neck pain.   Neurological:  Positive for headaches. Negative for weakness.   Psychiatric/Behavioral:  Negative for confusion and dysphoric mood.        Objective:      LMP 10/24/2024   Physical Exam  Neurological:      Mental Status: Mental status is at baseline.   Psychiatric:         Mood and Affect: Mood normal.         Behavior: Behavior normal.       audiovisual virtual visit  Assessment:       1. Overweight with body mass index (BMI) of 28 to 28.9 in adult    2. Chronic migraine without aura without status migrainosus, not intractable    3. Mixed hyperlipidemia        Plan:       Overweight with body mass index (BMI) of 28 to 28.9 in adult  -     tirzepatide, weight loss, 2.5 mg/0.5 mL PnIj; Inject 2.5 mg into the skin every 7 days.  Dispense: 1 Pen; Refill: 4  - Consider Contrave if medication is cost prohibitive.    Chronic migraine without aura without status migrainosus, not intractable  - Continue to follow up with neurology as scheduled, prescribed propranolol and sumatriptan.    Mixed hyperlipidemia  - Recommend 150 minutes of moderate aerobic activity weekly or 75 minutes of vigorous.  - Mediterranean diet recommended and educational print out given.      Counseled on regular  exercise, maintenance of a healthy weight, balanced diet rich in fruits/vegetables and lean protein, and avoidance of unhealthy habits like smoking and excessive alcohol intake.    Follow up in one month or sooner if needed.    Visit today included increased complexity associated with the care of the episodic problem BMI 28 addressed and managing the longitudinal care of the patient due to the serious and/or complex managed problem(s) mixed hyperlipidemia.

## 2024-11-25 ENCOUNTER — CLINICAL SUPPORT (OUTPATIENT)
Dept: OBSTETRICS AND GYNECOLOGY | Facility: CLINIC | Age: 24
End: 2024-11-25
Payer: MEDICAID

## 2024-11-25 ENCOUNTER — PATIENT MESSAGE (OUTPATIENT)
Dept: NEUROLOGY | Facility: CLINIC | Age: 24
End: 2024-11-25
Payer: MEDICAID

## 2024-11-25 ENCOUNTER — PATIENT MESSAGE (OUTPATIENT)
Dept: OBSTETRICS AND GYNECOLOGY | Facility: CLINIC | Age: 24
End: 2024-11-25

## 2024-11-25 ENCOUNTER — TELEPHONE (OUTPATIENT)
Dept: OBSTETRICS AND GYNECOLOGY | Facility: CLINIC | Age: 24
End: 2024-11-25

## 2024-11-25 ENCOUNTER — TELEPHONE (OUTPATIENT)
Dept: OBSTETRICS AND GYNECOLOGY | Facility: CLINIC | Age: 24
End: 2024-11-25
Payer: MEDICAID

## 2024-11-25 DIAGNOSIS — N91.2 AMENORRHEA: Primary | ICD-10-CM

## 2024-11-25 DIAGNOSIS — G43.709 CHRONIC MIGRAINE WITHOUT AURA WITHOUT STATUS MIGRAINOSUS, NOT INTRACTABLE: Primary | ICD-10-CM

## 2024-11-25 PROCEDURE — 99212 OFFICE O/P EST SF 10 MIN: CPT | Mod: PBBFAC,PO

## 2024-11-25 PROCEDURE — 99999 PR PBB SHADOW E&M-EST. PATIENT-LVL II: CPT | Mod: PBBFAC,,,

## 2024-11-25 RX ORDER — BUTALBITAL, ACETAMINOPHEN AND CAFFEINE 50; 325; 40 MG/1; MG/1; MG/1
1 TABLET ORAL EVERY 6 HOURS PRN
Qty: 10 TABLET | Refills: 10 | Status: SHIPPED | OUTPATIENT
Start: 2024-11-25 | End: 2024-12-25

## 2024-11-25 NOTE — TELEPHONE ENCOUNTER
Informed patient no sooner appointment. Patient verbalized understanding.    ----- Message from Cordell sent at 11/25/2024  1:16 PM CST -----  Type:  Sooner Appointment Request    Caller is requesting a sooner appointment.  Caller declined first available appointment listed below.  Caller will not accept being placed on the waitlist and is requesting a message be sent to doctor.    Name of Caller:  pt  When is the first available appointment?  N/A  Symptoms:  Pregnancy confirmation  Would the patient rather a call back or a response via [facility]? Call  Best Call Back Number:  [phone]  Additional Information:  pt is scheduled for 12/16 but is asking to be seen the week of 12/09. Please call back to advise. Thanks!

## 2024-11-25 NOTE — TELEPHONE ENCOUNTER
Placed call to pt. Pt would like to transfer care to [facility]. Provided phone number for pt to call to [facility] appts [phone]. Questions answered. Verbalized understanding.

## 2024-11-25 NOTE — PROGRESS NOTES
Spoke with patient for a total of 20 minutes during virtual visit.  Updated chart to reflect up to date patient demographics.  Allergies, medications, pharmacy, medical/family history and OB history updated.  Patient was guided through expectations of care during pregnancy.  Pregnancy confirmation, dating u/s & first routine OB appts WERE NOT scheduled. Pt just moved & would like to est care on the [facility]. Sent mess to [facility] on [place], to reach out to pt to sched new preg appts.   Education provided & questions answered. Encouraged to send message or call office with any questions/concerns. Verbalized understanding.     Discussed with pt:    Lmp 10/24  Encouraged to begin taking PNV   denies n/v  denies cramping/spotting  Precautions discussed  Referred to [email or web address] for Preg A to Z guide & class schedule   Discussed benefits of breastfeeding   Discussed need for pediatrician  Prev pt of [facility]-would like to est care on [facility]

## 2024-11-26 ENCOUNTER — TELEPHONE (OUTPATIENT)
Dept: OBSTETRICS AND GYNECOLOGY | Facility: CLINIC | Age: 24
End: 2024-11-26
Payer: MEDICAID

## 2024-11-26 ENCOUNTER — PATIENT MESSAGE (OUTPATIENT)
Dept: OBSTETRICS AND GYNECOLOGY | Facility: CLINIC | Age: 24
End: 2024-11-26
Payer: MEDICAID

## 2024-11-26 DIAGNOSIS — Z32.01 POSITIVE PREGNANCY TEST: Primary | ICD-10-CM

## 2024-11-26 NOTE — TELEPHONE ENCOUNTER
----- Message from Cordell sent at 11/26/2024  4:13 PM CST -----  Type: Needs Medical Advice  Who Called:  pt  Best Call Back Number: [phone]  Additional Information: pt is calling the office to verify an ultrasound will be taken at her pregnancy confirmation appointment. Please call back to advise. Thanks!

## 2024-11-27 ENCOUNTER — LAB VISIT (OUTPATIENT)
Dept: LAB | Facility: HOSPITAL | Age: 24
End: 2024-11-27
Attending: OBSTETRICS & GYNECOLOGY
Payer: MEDICAID

## 2024-11-27 DIAGNOSIS — Z32.01 POSITIVE PREGNANCY TEST: ICD-10-CM

## 2024-11-27 DIAGNOSIS — R30.0 DYSURIA: ICD-10-CM

## 2024-11-27 DIAGNOSIS — G43.809 OTHER MIGRAINE WITHOUT STATUS MIGRAINOSUS, NOT INTRACTABLE: ICD-10-CM

## 2024-11-27 LAB
ALBUMIN SERPL BCP-MCNC: 3.9 G/DL (ref 3.5–5.2)
ALP SERPL-CCNC: 65 U/L (ref 40–150)
ALT SERPL W/O P-5'-P-CCNC: 7 U/L (ref 10–44)
ANION GAP SERPL CALC-SCNC: 7 MMOL/L (ref 8–16)
AST SERPL-CCNC: 14 U/L (ref 10–40)
BASOPHILS # BLD AUTO: 0.01 K/UL (ref 0–0.2)
BASOPHILS NFR BLD: 0.1 % (ref 0–1.9)
BILIRUB SERPL-MCNC: 0.3 MG/DL (ref 0.1–1)
BUN SERPL-MCNC: 8 MG/DL (ref 6–20)
CALCIUM SERPL-MCNC: 9.2 MG/DL (ref 8.7–10.5)
CHLORIDE SERPL-SCNC: 107 MMOL/L (ref 95–110)
CO2 SERPL-SCNC: 22 MMOL/L (ref 23–29)
CREAT SERPL-MCNC: 0.8 MG/DL (ref 0.5–1.4)
DIFFERENTIAL METHOD BLD: ABNORMAL
EOSINOPHIL # BLD AUTO: 0.1 K/UL (ref 0–0.5)
EOSINOPHIL NFR BLD: 0.8 % (ref 0–8)
ERYTHROCYTE [DISTWIDTH] IN BLOOD BY AUTOMATED COUNT: 17 % (ref 11.5–14.5)
EST. GFR  (NO RACE VARIABLE): >60 ML/MIN/1.73 M^2
GLUCOSE SERPL-MCNC: 94 MG/DL (ref 70–110)
HCG INTACT+B SERPL-ACNC: 422 MIU/ML
HCT VFR BLD AUTO: 35.8 % (ref 37–48.5)
HGB BLD-MCNC: 11.2 G/DL (ref 12–16)
IMM GRANULOCYTES # BLD AUTO: 0.02 K/UL (ref 0–0.04)
IMM GRANULOCYTES NFR BLD AUTO: 0.3 % (ref 0–0.5)
LYMPHOCYTES # BLD AUTO: 2.2 K/UL (ref 1–4.8)
LYMPHOCYTES NFR BLD: 30.1 % (ref 18–48)
MCH RBC QN AUTO: 25.5 PG (ref 27–31)
MCHC RBC AUTO-ENTMCNC: 31.3 G/DL (ref 32–36)
MCV RBC AUTO: 82 FL (ref 82–98)
MONOCYTES # BLD AUTO: 0.8 K/UL (ref 0.3–1)
MONOCYTES NFR BLD: 10.3 % (ref 4–15)
NEUTROPHILS # BLD AUTO: 4.3 K/UL (ref 1.8–7.7)
NEUTROPHILS NFR BLD: 58.4 % (ref 38–73)
NRBC BLD-RTO: 0 /100 WBC
PLATELET # BLD AUTO: 376 K/UL (ref 150–450)
PMV BLD AUTO: 9.5 FL (ref 9.2–12.9)
POTASSIUM SERPL-SCNC: 4.1 MMOL/L (ref 3.5–5.1)
PROT SERPL-MCNC: 7.8 G/DL (ref 6–8.4)
RBC # BLD AUTO: 4.39 M/UL (ref 4–5.4)
SODIUM SERPL-SCNC: 136 MMOL/L (ref 136–145)
WBC # BLD AUTO: 7.41 K/UL (ref 3.9–12.7)

## 2024-11-27 PROCEDURE — 36415 COLL VENOUS BLD VENIPUNCTURE: CPT | Mod: PN | Performed by: OBSTETRICS & GYNECOLOGY

## 2024-11-27 PROCEDURE — 85025 COMPLETE CBC W/AUTO DIFF WBC: CPT | Performed by: OBSTETRICS & GYNECOLOGY

## 2024-11-27 PROCEDURE — 84702 CHORIONIC GONADOTROPIN TEST: CPT | Performed by: OBSTETRICS & GYNECOLOGY

## 2024-11-27 PROCEDURE — 80053 COMPREHEN METABOLIC PANEL: CPT | Performed by: OBSTETRICS & GYNECOLOGY

## 2024-12-02 ENCOUNTER — PATIENT MESSAGE (OUTPATIENT)
Dept: FAMILY MEDICINE | Facility: CLINIC | Age: 24
End: 2024-12-02
Payer: MEDICAID

## 2024-12-09 ENCOUNTER — TELEPHONE (OUTPATIENT)
Dept: FAMILY MEDICINE | Facility: CLINIC | Age: 24
End: 2024-12-09
Payer: MEDICAID

## 2024-12-10 ENCOUNTER — OFFICE VISIT (OUTPATIENT)
Dept: OBSTETRICS AND GYNECOLOGY | Facility: CLINIC | Age: 24
End: 2024-12-10
Payer: MEDICAID

## 2024-12-10 VITALS
WEIGHT: 165.81 LBS | BODY MASS INDEX: 28.31 KG/M2 | HEIGHT: 64 IN | SYSTOLIC BLOOD PRESSURE: 124 MMHG | DIASTOLIC BLOOD PRESSURE: 72 MMHG

## 2024-12-10 DIAGNOSIS — Z34.90 EARLY STAGE OF PREGNANCY: ICD-10-CM

## 2024-12-10 DIAGNOSIS — Z12.4 CERVICAL CANCER SCREENING: ICD-10-CM

## 2024-12-10 DIAGNOSIS — Z32.01 ENCOUNTER FOR PREGNANCY TEST, RESULT POSITIVE: Primary | ICD-10-CM

## 2024-12-10 DIAGNOSIS — O09.899 SHORT INTERVAL BETWEEN PREGNANCIES AFFECTING PREGNANCY, ANTEPARTUM: ICD-10-CM

## 2024-12-10 LAB
B-HCG UR QL: POSITIVE
CTP QC/QA: YES

## 2024-12-10 PROCEDURE — 87491 CHLMYD TRACH DNA AMP PROBE: CPT | Performed by: OBSTETRICS & GYNECOLOGY

## 2024-12-10 PROCEDURE — 76817 TRANSVAGINAL US OBSTETRIC: CPT | Mod: PBBFAC,PN | Performed by: OBSTETRICS & GYNECOLOGY

## 2024-12-10 PROCEDURE — 99213 OFFICE O/P EST LOW 20 MIN: CPT | Mod: PBBFAC,TH,PN | Performed by: OBSTETRICS & GYNECOLOGY

## 2024-12-10 PROCEDURE — 99999PBSHW POCT URINE PREGNANCY: Mod: PBBFAC,,,

## 2024-12-10 PROCEDURE — 87086 URINE CULTURE/COLONY COUNT: CPT | Performed by: OBSTETRICS & GYNECOLOGY

## 2024-12-10 PROCEDURE — 99999 PR PBB SHADOW E&M-EST. PATIENT-LVL III: CPT | Mod: PBBFAC,,, | Performed by: OBSTETRICS & GYNECOLOGY

## 2024-12-10 PROCEDURE — 88175 CYTOPATH C/V AUTO FLUID REDO: CPT | Performed by: OBSTETRICS & GYNECOLOGY

## 2024-12-10 PROCEDURE — 81025 URINE PREGNANCY TEST: CPT | Mod: PBBFAC,PN | Performed by: OBSTETRICS & GYNECOLOGY

## 2024-12-10 PROCEDURE — 87661 TRICHOMONAS VAGINALIS AMPLIF: CPT | Performed by: OBSTETRICS & GYNECOLOGY

## 2024-12-10 NOTE — PROGRESS NOTES
"Subjective:      Mela Bear is being seen today for her first obstetrical visit.  She is at Unknown gestation.     Patient's last menstrual period was 10/24/2024 (exact date).   6.5 wga by her last menstrual period  No issues  Admit to light spotting.    Menstrual History:  OB History          2    Para   2    Term   2       0    AB   0    Living   2         SAB   0    IAB   0    Ectopic   0    Multiple   0    Live Births   2                  The following portions of the patient's history were reviewed and updated as appropriate: allergies, current medications, past family history, past medical history, past social history, past surgical history, and problem list.     Review of Systems  Constitutional: negative for chills and fatigue  Gastrointestinal: negative for abdominal pain, constipation, diarrhea, nausea and vomiting  Genitourinary:negative for abnormal menstrual periods, genital lesions and vaginal discharge, dysuria, frequency and hematuria     Objective:      /72   Ht 5' 4" (1.626 m)   Wt 75.2 kg (165 lb 12.6 oz)   LMP 10/24/2024 (Exact Date)   BMI 28.46 kg/m²   General appearance: alert, appears stated age, and cooperative      ULTRASOUND:   Bedside Ultrasound Findings    EXAMINATION:  US PELVIS COMP WITH TRANSVAG OB    CLINICAL HISTORY:  UPT positive    TECHNIQUE:  Transvaginal sonography    COMPARISON:  None    FINDINGS:  1. Uterus:    Appearance: Viable stevens intrauterine pregnancy was not seen  Gestational sac seen and yolk sac seen. No fetal pole or fetal heart rate .         Impression      1. Early Stage pregnancy   2. Gestational sac seen and yolk sac seen. No fetal pole or fetal heart rate .    Follow up two weeks.     Assessment:      Pregnancy at Unknown       Plan:      Initial labs drawn.  Prenatal vitamins.  Problem list reviewed and updated.    Encounter for pregnancy test, result positive  -     POCT urine pregnancy    Early stage of pregnancy  -     CULTURE, " URINE  -     Trichomonas vaginalis, RNA, Qual, Urine  -     C. trachomatis/N. gonorrhoeae by AMP DNA [facility]; Urine  -     US OB <14 Wks TransAbd & TransVag, Single Gestation (XPD); Future; Expected date: 12/10/2024    Cervical cancer screening  -     Cancel: Liquid-Based Pap Smear, Screening  -     Pap Smear, Thin Prep with Reflex to HPV    Short interval between pregnancies affecting pregnancy, antepartum        Follow up in 4 weeks.    I reviewed today her blood pressure, weight gain, urine results and  fetal heart rate.  I have reviewed the previous labs and ultrasound with the patient.   I have answered questions to her satisfaction and total of 20 minutes spend today

## 2024-12-11 NOTE — PROCEDURES
Procedures    ULTRASOUND:   Bedside Ultrasound Findings    EXAMINATION:  US PELVIS COMP WITH TRANSVAG OB    CLINICAL HISTORY:  UPT positive    TECHNIQUE:  Transvaginal sonography    COMPARISON:  None    FINDINGS:  1. Uterus:    Appearance: Viable stevens intrauterine pregnancy was not seen  Gestational sac seen and yolk sac seen. No fetal pole or fetal heart rate .         Impression      1. Early Stage pregnancy   2. Gestational sac seen and yolk sac seen. No fetal pole or fetal heart rate .    Follow up two weeks.

## 2024-12-12 LAB
BACTERIA UR CULT: NORMAL
BACTERIA UR CULT: NORMAL
SPECIMEN SOURCE: NORMAL
T VAGINALIS RRNA SPEC QL NAA+PROBE: NEGATIVE

## 2024-12-13 LAB
CLINICAL INFO: NORMAL
CYTO CVX: NORMAL
CYTOLOGIST CVX/VAG CYTO: NORMAL
CYTOLOGIST CVX/VAG CYTO: NORMAL
CYTOLOGY CMNT CVX/VAG CYTO-IMP: NORMAL
CYTOLOGY PAP THIN PREP EXPLANATION: NORMAL
DATE OF PREVIOUS PAP: NORMAL
DATE PREVIOUS BX: NORMAL
GEN CATEG CVX/VAG CYTO-IMP: NORMAL
LMP START DATE: NORMAL
MICROORGANISM CVX/VAG CYTO: NORMAL
PATHOLOGIST CVX/VAG CYTO: NORMAL
SERVICE CMNT-IMP: NORMAL
SPECIMEN SOURCE CVX/VAG CYTO: NORMAL
STAT OF ADQ CVX/VAG CYTO-IMP: NORMAL

## 2024-12-16 ENCOUNTER — PATIENT MESSAGE (OUTPATIENT)
Dept: FAMILY MEDICINE | Facility: CLINIC | Age: 24
End: 2024-12-16
Payer: MEDICAID

## 2024-12-16 ENCOUNTER — PATIENT MESSAGE (OUTPATIENT)
Dept: OBSTETRICS AND GYNECOLOGY | Facility: CLINIC | Age: 24
End: 2024-12-16
Payer: MEDICAID

## 2024-12-23 ENCOUNTER — PATIENT MESSAGE (OUTPATIENT)
Dept: OBSTETRICS AND GYNECOLOGY | Facility: CLINIC | Age: 24
End: 2024-12-23

## 2024-12-23 ENCOUNTER — ROUTINE PRENATAL (OUTPATIENT)
Dept: OBSTETRICS AND GYNECOLOGY | Facility: CLINIC | Age: 24
End: 2024-12-23
Payer: MEDICAID

## 2024-12-23 ENCOUNTER — HOSPITAL ENCOUNTER (OUTPATIENT)
Dept: RADIOLOGY | Facility: HOSPITAL | Age: 24
Discharge: HOME | End: 2024-12-23
Attending: OBSTETRICS & GYNECOLOGY
Payer: MEDICAID

## 2024-12-23 VITALS — WEIGHT: 166 LBS | BODY MASS INDEX: 28.49 KG/M2 | SYSTOLIC BLOOD PRESSURE: 118 MMHG | DIASTOLIC BLOOD PRESSURE: 68 MMHG

## 2024-12-23 DIAGNOSIS — Z34.90 PREGNANCY, UNSPECIFIED GESTATIONAL AGE: Primary | ICD-10-CM

## 2024-12-23 DIAGNOSIS — Z34.90 EARLY STAGE OF PREGNANCY: ICD-10-CM

## 2024-12-23 LAB
BILIRUBIN, UA POC OHS: NEGATIVE
BLOOD, UA POC OHS: ABNORMAL
CLARITY, UA POC OHS: CLEAR
COLOR, UA POC OHS: YELLOW
GLUCOSE, UA POC OHS: NEGATIVE
KETONES, UA POC OHS: NEGATIVE
LEUKOCYTES, UA POC OHS: NEGATIVE
NITRITE, UA POC OHS: NEGATIVE
PH, UA POC OHS: 6.5
PROTEIN, UA POC OHS: 100
SPECIFIC GRAVITY, UA POC OHS: 1.02
UROBILINOGEN, UA POC OHS: 0.2

## 2024-12-23 PROCEDURE — 99212 OFFICE O/P EST SF 10 MIN: CPT | Mod: PBBFAC,25,TH,PN

## 2024-12-23 PROCEDURE — 99999PBSHW POCT URINALYSIS(INSTRUMENT): Mod: PBBFAC,,,

## 2024-12-23 PROCEDURE — 76801 OB US < 14 WKS SINGLE FETUS: CPT | Mod: TC,PN

## 2024-12-23 PROCEDURE — 81003 URINALYSIS AUTO W/O SCOPE: CPT | Mod: PBBFAC,PN

## 2024-12-23 PROCEDURE — 99999 PR PBB SHADOW E&M-EST. PATIENT-LVL II: CPT | Mod: PBBFAC,,,

## 2024-12-23 NOTE — PROGRESS NOTES
The patient presents with complaints of light vaginal bleeding for the past week. Dark brown blood with mucous. Denies heavy bleeding or pain.   US today show IUP, CRL consistent with 6 week gestation. NO fetal heart tones.  Discussed ultrasound findings with patient and spouse. Coping well.   We discussed options at this time are expectant management, medication management or D&C. After r/b/a discussion, desires expectant management. ER precautions reviewed. To present for heavy bleeding, soaking a pad an hour, passing large clots, intolerable pain, fever, abdominal tenderness or foul smelling discharge. She verbalizes understanding    2024  24 y.o. Unknown Estimated Date of Delivery: None noted., dating reviewed.   OB History    Para Term  AB Living   4 2 2 0 0 2   SAB IAB Ectopic Multiple Live Births   0 0 0 0 2      # Outcome Date GA Lbr Shane/2nd Weight Sex Type Anes PTL Lv   4 Current            3 Term 24 39w1d / 00:21 3.05 kg (6 lb 11.6 oz) M Vag-Spont None N ASTRID   2 Term 21 39w5d  3.501 kg (7 lb 11.5 oz) M Vag-Spont None N ASTRID   1                 Prenatal labs reviewed and updated today    Review of Systems:  General ROS: negative for headache or visual changes  Breast ROS: negative for breast lumps  Gastrointestinal ROS: negative for  constipation, diarrhea or nausea/vomiting  Musculoskeletal ROS: negative for pain in joints or swelling in face or hands.   Neurological ROS: negative for - headaches, numbness/tingling or visual changes      Physical Exam:  /68   Wt 75.3 kg (166 lb 0.1 oz)   LMP 10/24/2024 (Exact Date)   BMI 28.49 kg/m²   Urine Dip: Pending  Constitutional: She is oriented to person, place, and time. She appears well-developed and well-nourished. No distress. Normal weight   Cardiovascular: Normal rate.    Pulmonary/Chest: Effort normal. No respiratory distress  Abdominal: Soft, gravid, nontender. No rebound and no guarding.     Genitourinary: SSE  performed, scant brown blood in vault. Cervix appears 0.5cm dilated    Musculoskeletal: Normal range of motion, no peripheral edema.   Neurological: She is alert and oriented to person, place, and time. Coordination normal.   Skin: Skin is warm and dry. She is not diaphoretic.  Psychiatric: She has a normal mood and affect.        Assessment:  24 y.o., at Unknown Gestation   Patient Active Problem List   Diagnosis    Fetal renal anomaly, single gestation    Iron deficiency anemia, unspecified    Fatty liver    Abnormal urinalysis    Chronic intractable headache    History of ADHD    Chronic migraine without aura without status migrainosus, not intractable    ADHD (attention deficit hyperactivity disorder) evaluation    Short interval between pregnancies affecting pregnancy, antepartum     Current Outpatient Medications on File Prior to Visit   Medication Sig Dispense Refill    butalbital-acetaminophen-caffeine -40 mg (FIORICET, ESGIC) -40 mg per tablet Take 1 tablet by mouth every 6 (six) hours as needed for Pain. No more than 2 tablets per 24 hours 10 tablet 10    ferrous sulfate 325 (65 FE) MG EC tablet Take 1 tablet (325 mg total) by mouth every other day. 60 tablet 3     No current facility-administered medications on file prior to visit.       Plan:   Labs today:none. Blood type O+  Orders today: follow up Friday with MD  MEds today: none  Procedures Today: none    I spent a total of 20 minutes on the day of the visit. This includes face to face time and non-face to face time preparing to see the patient (eg, review of tests), obtaining and/or reviewing separately obtained history, documenting clinical information in the electronic or other health record, independently interpreting results and communicating results to the patient/family/caregiver, or care coordinator.

## 2024-12-27 ENCOUNTER — PATIENT MESSAGE (OUTPATIENT)
Dept: OBSTETRICS AND GYNECOLOGY | Facility: CLINIC | Age: 24
End: 2024-12-27
Payer: MEDICAID

## 2024-12-27 NOTE — TELEPHONE ENCOUNTER
Called patient.     We discussed her results and care.     She states she did have a SAB. She had an ultrasound yesterday that no longer showed an intrauterine pregnancy.   All patients questions answered.     Raysa Almonte MD

## 2025-01-05 ENCOUNTER — PATIENT MESSAGE (OUTPATIENT)
Dept: OBSTETRICS AND GYNECOLOGY | Facility: CLINIC | Age: 25
End: 2025-01-05
Payer: MEDICAID

## 2025-01-15 ENCOUNTER — OFFICE VISIT (OUTPATIENT)
Dept: NEUROLOGY | Facility: CLINIC | Age: 25
End: 2025-01-15
Payer: MEDICAID

## 2025-01-15 DIAGNOSIS — G43.709 CHRONIC MIGRAINE WITHOUT AURA WITHOUT STATUS MIGRAINOSUS, NOT INTRACTABLE: Primary | ICD-10-CM

## 2025-01-15 RX ORDER — RIZATRIPTAN BENZOATE 10 MG/1
10 TABLET, ORALLY DISINTEGRATING ORAL
Qty: 10 TABLET | Refills: 11 | Status: SHIPPED | OUTPATIENT
Start: 2025-01-15 | End: 2025-02-14

## 2025-01-15 NOTE — PATIENT INSTRUCTIONS
Please call our clinic at [phone] or send a message on the [facility] portal if there are any changes to the plan described below, for example,if you are not contacted for the requested tests, referral(s) within one week, if you are unable to receive the medications prescribed, or if you feel you need to change the treatment course for any reason.     TESTING:  none     REFERRALS: none     PREVENTION (use daily regardless of headache):  - Start Magnesium in ONE of the following preparations -               1. Magnesium oxide 800mg daily (the most common over the counter kind, may causes loose stools)              2. Magnesium citrate 400-500mg daily (harder to find, but more neutral on the bowels)              3. Magnesium glycinate 400mg daily (hardest to find, look online, but most bowel-neutral, best absorbed)   - Start Propranolol 10 mg nightly with increase to twice daily after 3-4 nights. Consider titration of dose in the future. Monitor heart rate and blood pressure at home. Hold medication for heart rate less than 60, systolic blood pressure less than 90 or if symptomatic.     - Consider Topamax, Effexor, monoclonal +/- Botox     AS-NEEDED TREATMENT (use total no more than 10 days per month unless otherwise stated):  - Stop Imitrex  - Start Maxalt 10 mg as needed   - Consider CGRP in the future (Nurtec)     OTHER:   - Increase daily water intake   - Headache journal

## 2025-01-15 NOTE — PROGRESS NOTES
Date of service: 1/15/2025  Referring provider: No ref. provider found    Subjective:      Chief complaint: Headache       Patient ID: Mela Bear is a 24 y.o. .     History of Present Illness  INTERVAL HISTORY: 01/15/2025.   The patient location is: home  The chief complaint leading to consultation is: follow up  Visit type: audiovisual  Face to Face time with patient: 15  20 minutes of total time spent on the encounter, which includes face to face time and non-face to face time preparing to see the patient (eg, review of tests), Obtaining and/or reviewing separately obtained history, Documenting clinical information in the electronic or other health record, Independently interpreting results (not separately reported) and communicating results to the patient/family/caregiver, or Care coordination (not separately reported).   Each patient to whom he or she provides medical services by telemedicine is:  (1) informed of the relationship between the physician and patient and the respective role of any other health care provider with respect to management of the patient; and (2) notified that he or she may decline to receive medical services by telemedicine and may withdraw from such care at any time.     Notes:   She presents today for migraine follow up. Since her last visit, she did not start Propranolol due to positive pregnancy test. She unfortunately had a miscarriage. Since then, she continues Imitrex as needed which was effective for 4 doses then she developed side effects of dizziness and upper extremity numbness lasting 2 hours. Today, she reports headaches 4 times per week, one of which is typically severe. She works as a  and typically gets a headache during days she works around 2 pm. Otherwise information below is reviewed and verified with no changes made.     ORIGINAL HEADACHE HISTORY - 11/08/2024  Age at onset and course over time: [age] intermittently prior to pregnancy of her first  child. After delivery of second child (01/29/2024) headaches increased in frequency, duration and intensity. Today, she reports daily headache with escalation to migraine roughly 4 times per week. She is typically unable to drive due to frequency of migraine.     Location: frontal, temporal   Quality:  [] Stabbing [x] Pressure [x] Tight [x] Throbbing/pounding [] Sharp    Duration: [] Seconds [] Minutes [x] Hours [] Days [] Constant   Frequency: [x] Daily [] Weekly [] Monthly   How many days per month is your head or neck 100% pain free: 0  Headaches awaken at night?:     Worst time of day: mid-day, evening, overnight   Intensity of pain: at best 2/10, at worst 10/10   Associated with: [x] Photophobia []  Phonophobia [] Osmophobia [] Loss of appetite [x] Nausea [] Vomiting   [x] Dizziness [x] Vertigo [x] Ringing in the ears [] Blurry vision [] Double vision  [x] Anxiety/Anger/Irritability [x] Problems with concentration [x] Problems with memory [x] Problems with task completion   [x] Problems with relaxation [] Neck tightness/ neck pain [] Nasal congestion [x] Nasal or sinus pressure [x] Aura   Alleviated by:  [x] Sleep [x] Darkness [] Local pressure [] Massage [] Heat [] Ice [] Menses [x] Medication  Exacerbated by:  [x] Fatigue [x] Light [x] Noise [] Smells [] Coughing [x] Sneezing  [] Bending over [] Change in weather [] Ovulation [x] Menses [x] Alcohol [x] Stress []  Food  Ipsilateral autonomic: [] nasal congestion [] lacrimation [] ptosis [] injection [] edema [] foreign body sensation [] ear fullness   ICP:  [] transient visual obscurations  [x] tinnitus - low pitched, steady, left ear   [] positional headache  [] non-positional     Bowl Habits: [] Normal [] Constipation [] Diarrhea   Caffeine intake: 70 mg, 3-4 days per week   Sleep habits: un-refreshed sleep   Water intake: 3 bottles per day    Eye Exam: up to date   Family history of migraine: sister   Gyn status (if female) (birth control with estrogen,  hysterectomy): having periods   History of asthma, cancer, glaucoma, kidney stones, CVA and osteoporosis: none     HIT 6: 74    Current acute treatment:  Ibuprofen   Tylenol   Imitrex     Current prevention:  None     Previously tried/failed acute treatment:  Nurtec sample tried once previously very effective     Previously tried/failed preventative treatment:  None     Considerations:     Review of patient's allergies indicates:  No Known Allergies  Current Outpatient Medications   Medication Sig Dispense Refill    ferrous sulfate 325 (65 FE) MG EC tablet Take 1 tablet (325 mg total) by mouth every other day. 60 tablet 3    rizatriptan (MAXALT-MLT) 10 MG disintegrating tablet Take 1 tablet (10 mg total) by mouth as needed for Migraine. May repeat in 2 hours if needed 10 tablet 11     No current facility-administered medications for this visit.       Past Medical History  Past Medical History:   Diagnosis Date    Anemia, unspecified     Iron deficiency anemia, unspecified     Migraines        Past Surgical History  No past surgical history on file.    Family History  Family History   Problem Relation Name Age of Onset    No Known Problems Mother Quochandra         uterine fibroids; prediabetes    No Known Problems Father Issac Nguyen     Diabetes type II Sister Alisa Nguyen     Hyperlipidemia Maternal Grandmother      Hypertension Maternal Grandmother      Diabetes Maternal Grandfather      Breast cancer Neg Hx      Colon cancer Neg Hx      Ovarian cancer Neg Hx         Social History  Social History     Socioeconomic History    Marital status:    Occupational History    Occupation: Stay at home mom    Occupation: Studying for Ultrasound Technician   Tobacco Use    Smoking status: Never     Passive exposure: Never    Smokeless tobacco: Never   Substance and Sexual Activity    Alcohol use: Not Currently     Alcohol/week: 2.0 standard drinks of alcohol     Types: 2 Glasses of wine per week     Comment: rare    Drug  use: No    Sexual activity: Yes     Partners: Male     Birth control/protection: None     Comment:    Social History Narrative    Lives with  and two children and a pet dog in [place].     Social Drivers of Health     Financial Resource Strain: Medium Risk (11/8/2024)    Overall Financial Resource Strain (CARDIA)     Difficulty of Paying Living Expenses: Somewhat hard   Food Insecurity: Food Insecurity Present (11/8/2024)    Hunger Vital Sign     Worried About Running Out of Food in the Last Year: Sometimes true     Ran Out of Food in the Last Year: Sometimes true   Physical Activity: Insufficiently Active (11/8/2024)    Exercise Vital Sign     Days of Exercise per Week: 2 days     Minutes of Exercise per Session: 30 min   Stress: Stress Concern Present (11/8/2024)    [employer] - Occupational Stress Questionnaire     Feeling of Stress : Very much   Housing Stability: High Risk (11/8/2024)    Housing Stability Vital Sign     Unable to Pay for Housing in the Last Year: Yes        Review of Systems  14-point review of systems as follows:   No check tobin indicates NEGATIVE response   Constitutional: [] weight loss [x] change to appetite   Eyes: [] change in vision [] double vision   Ears, nose, mouth, throat: [] frequent nose bleeds [x] ringing in the ears   Respiratory: [] cough [] wheezing   Cardiovascular: [] chest pain [x] palpitations   Gastrointestinal: [] jaundice [] nausea/vomiting   Genitourinary: [] incontinence [] burning with urination   Hematologic/lymphatic: [] easy bruising/bleeding [] night sweats   Neurological: [] numbness [] weakness   Endocrine: [x] fatigue [] heat/cold intolerance   Allergy/Immunologic: [] fevers [] chills   Musculoskeletal: [x] muscle pain [] joint pain   Psychiatric: [] thoughts of harming self/others [] depression   Integumentary: [] rashes [] sores that do not heal     Objective:        There were no vitals filed for this  visit.    There is no height or weight on file to calculate BMI.    General exam:  Alert, cooperative, not in distress  Normocephalic and atraumatic  Pink conjunctiva, anicteric sclera, moist mucous membranes  No cervical lymphadenopathy   No joint swelling or tenderness    Data Review:     I have personally reviewed the referring provider's notes, labs, & imaging made available to me today.      RADIOLOGY STUDIES:  I have personally reviewed the pertinent images performed.       Results for orders placed or performed during the hospital encounter of 09/17/24   MRI Brain W WO Contrast    Narrative    EXAMINATION:  MRI BRAIN W WO CONTRAST    CLINICAL HISTORY:  Headache, new or worsening, neuro deficit (Age 19-49y);.  Other migraine, not intractable, without status migrainosus    TECHNIQUE:  Multiplanar multisequence MR imaging of the brain was performed before and after the uneventful intravenous administration of 7 mL Gadavist.  Diffusion weighted imaging was performed.  ADC map was generated.    COMPARISON:  None.    FINDINGS:  Intracranial compartment:    There is no acute intracranial abnormality.  Brain volume, ventricular size and position are normal.  There is no hemorrhage or mass/mass effect.  There are no definite regions of abnormal signal intensity in the brain.  There are no regions of restricted diffusion to suggest acute infarction.  There is no pathologic enhancement.  The basilar cisterns are open.  There is no abnormal extra-axial fluid collection.  Flow voids indicating patency are present in the major vessels at the base of the brain.  The cerebellar tonsils are in normal position.  The sellar structures are normal.  The orbits are grossly normal.    Skull/extracranial contents: Marrow signal intensity in the clivus and calvarium is grossly normal.  The included paranasal sinuses and mastoid air cells are clear.  The included facial soft tissues and scalp are normal.      Impression    1.  Normal  imaging of the brain.  There is no acute abnormality.  There is no hemorrhage, mass/mass effect, acute infarction.  There is no pathologic enhancement.      Electronically signed by: Elias Houser MD  Date:    09/17/2024  Time:    13:49       Lab Results   Component Value Date     11/27/2024    K 4.1 11/27/2024     11/27/2024    CO2 22 (L) 11/27/2024    BUN 8 11/27/2024    CREATININE 0.8 11/27/2024    GLU 94 11/27/2024    AST 14 11/27/2024    ALT 7 (L) 11/27/2024    ALBUMIN 3.9 11/27/2024    PROT 7.8 11/27/2024    BILITOT 0.3 11/27/2024    CHOL 210 (H) 11/11/2024    HDL 64 11/11/2024    LDLCALC 128.8 11/11/2024    TRIG 86 11/11/2024       Lab Results   Component Value Date    WBC 7.41 11/27/2024    HGB 11.2 (L) 11/27/2024    HCT 35.8 (L) 11/27/2024    MCV 82 11/27/2024     11/27/2024       Lab Results   Component Value Date    TSH 1.074 11/11/2024           Assessment & Plan:       Problem List Items Addressed This Visit       Chronic migraine without aura without status migrainosus, not intractable - Primary    Overview     Headaches are typically moderate to severe in intensity, worsen with activity, pounding in quality and associated with sensitivity to light and nausea.     Recent MRI Brain unremarkable.     Start Propranolol 10 mg nightly with increase to twice daily after 3-4 nights. Consider titration of dose in the future. Monitor heart rate and blood pressure at home. Hold medication for heart rate less than 60, systolic blood pressure less than 90 or if symptomatic. Start Magnesium nightly. Stop Imitrex due to side effects. Start Maxalt 10 mg as needed. Ok to repeat dose 2 hours later. No more than 30 mg per 24 hours. Increase daily water intake. Headache journal.          Relevant Medications    rizatriptan (MAXALT-MLT) 10 MG disintegrating tablet             Please call our clinic at [phone] or send a message on the [facility] portal if there are any changes to the plan described  below, for example,if you are not contacted for the requested tests, referral(s) within one week, if you are unable to receive the medications prescribed, or if you feel you need to change the treatment course for any reason.     TESTING:  none     REFERRALS: none     PREVENTION (use daily regardless of headache):  - Start Magnesium in ONE of the following preparations -               1. Magnesium oxide 800mg daily (the most common over the counter kind, may causes loose stools)              2. Magnesium citrate 400-500mg daily (harder to find, but more neutral on the bowels)              3. Magnesium glycinate 400mg daily (hardest to find, look online, but most bowel-neutral, best absorbed)   - Start Propranolol 10 mg nightly with increase to twice daily after 3-4 nights. Consider titration of dose in the future. Monitor heart rate and blood pressure at home. Hold medication for heart rate less than 60, systolic blood pressure less than 90 or if symptomatic.   - as previously instructed   - Consider Topamax, Effexor, monoclonal +/- Botox     AS-NEEDED TREATMENT (use total no more than 10 days per month unless otherwise stated):  - Stop Imitrex  - Start Maxalt 10 mg as needed   - Consider CGRP in the future (Nurtec)     OTHER:   - Increase daily water intake   - Headache journal       Follow up in about 8 weeks (around 3/12/2025) for Virtual Visit.       MONTY BenitoC      I have spent 20 minutes of total time on the total encounter which includes face to face time and non-face to face time preparing to see the patient (eg, review of labs, previous encounters, care everywhere), obtaining and/or reviewing separately obtained history, documenting clinical information in the electronic or health record, independently interpreting results, and communicating results to the patient/family/caregiver, or care coordination.

## 2025-02-18 ENCOUNTER — PATIENT MESSAGE (OUTPATIENT)
Dept: PSYCHIATRY | Facility: CLINIC | Age: 25
End: 2025-02-18
Payer: MEDICAID

## 2025-02-19 ENCOUNTER — TELEPHONE (OUTPATIENT)
Dept: PSYCHIATRY | Facility: CLINIC | Age: 25
End: 2025-02-19
Payer: MEDICAID

## 2025-02-19 ENCOUNTER — OFFICE VISIT (OUTPATIENT)
Dept: PSYCHIATRY | Facility: CLINIC | Age: 25
End: 2025-02-19
Payer: MEDICAID

## 2025-02-19 ENCOUNTER — PATIENT MESSAGE (OUTPATIENT)
Dept: PSYCHIATRY | Facility: CLINIC | Age: 25
End: 2025-02-19
Payer: MEDICAID

## 2025-02-19 DIAGNOSIS — Z13.39 ADHD (ATTENTION DEFICIT HYPERACTIVITY DISORDER) EVALUATION: Primary | ICD-10-CM

## 2025-02-19 PROBLEM — Z86.59 HISTORY OF ADHD: Status: RESOLVED | Noted: 2024-11-07 | Resolved: 2025-02-19

## 2025-02-19 RX ORDER — ATOMOXETINE 80 MG/1
80 CAPSULE ORAL DAILY
Qty: 30 CAPSULE | Refills: 3 | Status: SHIPPED | OUTPATIENT
Start: 2025-02-26 | End: 2025-03-28

## 2025-02-19 RX ORDER — ATOMOXETINE 40 MG/1
40 CAPSULE ORAL DAILY
Qty: 7 CAPSULE | Refills: 0 | Status: SHIPPED | OUTPATIENT
Start: 2025-02-19 | End: 2025-02-26

## 2025-02-19 NOTE — PROGRESS NOTES
The patient location is: [place], [place]  The chief complaint leading to consultation is: ADHD evaluation  Visit type: Virtual visit with synchronous audio and video  Each patient to whom he or she provides medical services by telemedicine is:  (1) informed of the relationship between the physician and patient and the respective role of any other health care provider with respect to management of the patient; and (2) notified that he or she may decline to receive medical services by telemedicine and may withdraw from such care at any time.  Face-to-Face time: 21 minutes    Notes:      Outpatient Psychological Consultation    Name: Mela Bear  MRN: [id number]  : 2000    Testing appointment: 2025    ID:  Patient presents for consultation for diagnostic clarity in regard to difficulties with attention/concentration    Reason for encounter Referral from this author    Psychiatric records were reviewed prior to the diagnostic interview. Comprehensive psychological assessment will not be documented in this report rather will be focused on the referral question. See prior notes for comprehensive psychiatric work-up.    Chief Complaint: Pt is a 24 year old female presenting as a referral from this author for diagnostic clarification due to report of difficulty concentration/poor attention    Psychological Assessments Administered  Wender [place] Rating Scale for the Attention Deficit Hyperactivity Disorder  Chastity Adult ADHD Rating Scales-IV: Other-Report, current symptoms  Chastity Adult ADHD Rating Scales-IV: Other-Report, childhood symptoms  Josue Continuous Performance Test 3  The Dot Counting Test    Results    Wender [place] Rating Scale for the Attention Deficit Hyperactivity Disorder  The Wender [place] Rating Scale can be used to assess adults for Attention Deficit Hyperactivity Disorder with a subset of 25 questions associated with that diagnosis. It is a retrospective diagnosis of childhood  ADHD.      Wender [place] Rating Scale Subscore = 46 (sum of the 25 questions endorsed that are associated with ADHD)    Scores vary from 1-100, and the cutoff score is 46.    Given pt's report of their own symptoms of ADHD in childhood, their response style does support a diagnosis of ADHD.    Chastity Adult ADHD Rating Scales-IV: Other-Report, current symptoms  The BAARS-IV: Other-Report, current symptoms is a collateral measure of the patient's current symptoms of ADHD, as noted by someone with knowledge of the patient's executive functioning.    Thad Marianela is the evaluator whose relationship to pt is her spouse.     Inattention total score: 28       Hyperactivity total score: 17      Impulsivity total score: 13      Sluggish Cognitive Tempo total score: 22    ADHD total score (sum of Inattention/Hyperactivity/Impulsivity Subsections): 58       Inattention Symptom Count: 5     Hyperactivity/Impulsivity Symptom Count: 7   ADHD Symptom Count total (sum of Inattention/Hyperactivity/Impulsivity Subsections): 12     Based on the evaluator's report of pt's symptoms, pt does often struggle with 5 symptoms of inattention and 7 symptoms of hyperactivity/impulsivity. This supports a diagnosis of ADHD - combined type    Chastity Adult ADHD Rating Scales-IV: Other-Report, childhood symptoms  The BAARS-IV: Other-Report, childhood symptoms is a collateral measure of the patient's symptoms of ADHD between the ages of 5-12 years old, as reported by someone with knowledge of the patient's symptoms during childhood.    Dami Monae is the evaluator whose relationship to pt is her mother.     Inattention total score: 21   Hyperactivity/Impulsivity total score: 25     Inattention Symptom Count: 3    Hyperactivity/Impulsivity Symptom Count: 5     Based on the evaluator's report of symptoms, pt did exhibit sufficient symptoms of inattention, hyperactivity, and impulsivity that affected multiple settings at an early age to meet the  DSM-5 diagnosis criteria for an ADHD diagnosis.     Josue Continuous Performance Test 3  The Josue Continuous Performance Test 3rd Edition (Josue CPT 3) is an objective, task-oriented computerized assessment of attention-related problems. This measure creates an index of the respondent's performance in areas of inattentiveness, impulsivity, sustained attention, and vigilance.    Pt's performance on this objective measure does not indicate significant difficulties with sustained attention, hyperactivity, impulsivity, and difficulty maintaining vigilance with varying levels of stimulus frequency.    The Dot Counting Test  The Dot Counting Test (DCT) is a brief task that assesses test-taking effort in individuals.     Based on patient performance and score, pt appeared to demonstrate good effort.    Interpretation    Pt is a 24 year old female presenting as a referral from this author for diagnostic clarification due to report of difficulty concentration/poor attention. Pt reports attention/concentration concerns, however, based on the totality of the self-reported symptoms, collateral information, and objective testing, pt does not appear to meet criteria for an ADHD diagnosis.    Diagnosis     None    Plan and Recommendations    Continue mental health care.    Attention Tips Remember that inattention and lack of focus are major culprits to forgetting information so be sure and practice paying attention for adequate learning of information. If you rely on passive attention to remembering something (e.g., yeah, uh-huh approach), you'll find you cannot recall it later. I recommend the following to improve attention, which may aid in later recall:   Reduce distractions as much as possible.  Look at the person as they are speaking to you.   Paraphrase as they are speaking  Write down important pieces of information   Ask people to repeat if you zone out.    Have visual cues  (posted to-do-list, daily schedule)  to remind you if you need to do something later.   Processing Speed Tips Using multiple modalities (e.g., listening, writing notes, asking questions, recording) to learn new information is likely to allow additional time for processing, thus improving memory for the material.   Allowing sufficient time to complete tasks will reduce frustration and help to ensure completion.  Spend a lot of up-front time planning in advance how long a task may take and then chunk steps in the task so you don't wear yourself out.   Executive Functioning Tips: Don't attempt to multi-task.  Separate tasks so that each can be completed one at a time.  Consider using a calendar/day planner, as that may be effective to help you plan and stay on track.  Color-coding specific tasks by importance may add additional benefit to your planner.  Break down large projects into smaller tasks and write down the steps to completing the task.       BILLIN, 96139 X2 (90 minutes of testing and scoring with psychometrist), 59147, 37024 (2 hours of report writing, evaluation and feedback)

## 2025-02-19 NOTE — TELEPHONE ENCOUNTER
"Pharmacy called requesting a Dx code for Strattera. Dr. Huntley provided Z13.39. Pharmacy stated that Medicaid will not accept the code. Spoke with Dr. Huntley, stated there is no appropriate F code to provide, pt was not dx with ADHD. Pharmacy said out of pocket can be 500-1000 dollars and patient has Medicaid, unable to afford. Asked provider if there is another medication that can be substituted. Provider stated "no". Informed pharmacy staff of provider's response.   "

## 2025-03-06 ENCOUNTER — PATIENT MESSAGE (OUTPATIENT)
Dept: OBSTETRICS AND GYNECOLOGY | Facility: CLINIC | Age: 25
End: 2025-03-06

## 2025-03-07 ENCOUNTER — PATIENT MESSAGE (OUTPATIENT)
Facility: CLINIC | Age: 25
End: 2025-03-07

## 2025-03-07 DIAGNOSIS — O20.0 THREATENED ABORTION: Primary | ICD-10-CM

## 2025-03-10 ENCOUNTER — PATIENT MESSAGE (OUTPATIENT)
Dept: OBSTETRICS AND GYNECOLOGY | Facility: CLINIC | Age: 25
End: 2025-03-10

## 2025-03-10 ENCOUNTER — CLINICAL SUPPORT (OUTPATIENT)
Dept: OBSTETRICS AND GYNECOLOGY | Facility: CLINIC | Age: 25
End: 2025-03-10

## 2025-03-10 DIAGNOSIS — N91.2 ABSENT MENSES: Primary | ICD-10-CM

## 2025-03-10 PROCEDURE — 99999 PR PBB SHADOW E&M-EST. PATIENT-LVL II: CPT | Mod: PBBFAC,,,

## 2025-03-10 PROCEDURE — 99212 OFFICE O/P EST SF 10 MIN: CPT | Mod: PBBFAC,PN

## 2025-03-10 NOTE — PROGRESS NOTES
Spoke with patient for a total of 19 minutes during virtual visit.  Updated chart to reflect up to date patient demographics.  Allergies, medications, pharmacy, medical/family history and OB history updated.  Patient was guided through expectations of care during pregnancy.  Pregnancy confirmation scheduled for 03/31/25 at 11:20am with Dr. Almonte.  Education provided & questions answered. Encouraged to send message or call office with any questions/concerns. Verbalized understanding.     Discussed with pt:    Lmp 02/03/25.  Pt is currently taking a PNV.   C/o nausea, no vomiting reported.  C/o cramping/spotting.  Precautions discussed.  Referred to [email or web address] for Preg A to Z guide & class schedule.   Discussed benefits of breastfeeding - plans to breastfeed.   Discussed need for pediatrician. Current Ped is Dr. Ana Jensen.

## 2025-03-11 ENCOUNTER — LAB VISIT (OUTPATIENT)
Dept: LAB | Facility: HOSPITAL | Age: 25
End: 2025-03-11
Attending: OBSTETRICS & GYNECOLOGY

## 2025-03-11 DIAGNOSIS — O20.0 THREATENED ABORTION: ICD-10-CM

## 2025-03-11 PROCEDURE — 84702 CHORIONIC GONADOTROPIN TEST: CPT | Performed by: OBSTETRICS & GYNECOLOGY

## 2025-03-11 PROCEDURE — 36415 COLL VENOUS BLD VENIPUNCTURE: CPT | Mod: PN | Performed by: OBSTETRICS & GYNECOLOGY

## 2025-03-12 LAB — HCG INTACT+B SERPL-ACNC: 146 MIU/ML

## 2025-03-13 ENCOUNTER — LAB VISIT (OUTPATIENT)
Dept: LAB | Facility: HOSPITAL | Age: 25
End: 2025-03-13
Attending: OBSTETRICS & GYNECOLOGY

## 2025-03-13 DIAGNOSIS — Z32.01 POSITIVE PREGNANCY TEST: ICD-10-CM

## 2025-03-13 LAB — HCG INTACT+B SERPL-ACNC: 113 MIU/ML

## 2025-03-13 PROCEDURE — 84702 CHORIONIC GONADOTROPIN TEST: CPT | Performed by: OBSTETRICS & GYNECOLOGY

## 2025-03-13 PROCEDURE — 36415 COLL VENOUS BLD VENIPUNCTURE: CPT | Mod: PO | Performed by: OBSTETRICS & GYNECOLOGY

## 2025-03-14 NOTE — TELEPHONE ENCOUNTER
Called patient discussed decreasing HCG levels and not likely a normal pregnancy. Will repeat level Monday and do US next week with visit likely placed. discussed bleeding and ectopic precautions. This is her second SAB/not viable pregnancy in a row. Discussed possible causes and work up and she will consider it.

## 2025-03-17 ENCOUNTER — PATIENT MESSAGE (OUTPATIENT)
Dept: OBSTETRICS AND GYNECOLOGY | Facility: CLINIC | Age: 25
End: 2025-03-17

## 2025-03-20 ENCOUNTER — LAB VISIT (OUTPATIENT)
Dept: LAB | Facility: HOSPITAL | Age: 25
End: 2025-03-20
Attending: OBSTETRICS & GYNECOLOGY

## 2025-03-20 ENCOUNTER — PATIENT MESSAGE (OUTPATIENT)
Dept: FAMILY MEDICINE | Facility: CLINIC | Age: 25
End: 2025-03-20

## 2025-03-20 ENCOUNTER — PATIENT MESSAGE (OUTPATIENT)
Dept: OBSTETRICS AND GYNECOLOGY | Facility: CLINIC | Age: 25
End: 2025-03-20

## 2025-03-20 ENCOUNTER — OFFICE VISIT (OUTPATIENT)
Dept: OBSTETRICS AND GYNECOLOGY | Facility: CLINIC | Age: 25
End: 2025-03-20

## 2025-03-20 ENCOUNTER — HOSPITAL ENCOUNTER (OUTPATIENT)
Dept: RADIOLOGY | Facility: HOSPITAL | Age: 25
Discharge: HOME | End: 2025-03-20
Attending: OBSTETRICS & GYNECOLOGY

## 2025-03-20 VITALS
DIASTOLIC BLOOD PRESSURE: 75 MMHG | HEART RATE: 71 BPM | HEIGHT: 64 IN | WEIGHT: 161.63 LBS | SYSTOLIC BLOOD PRESSURE: 115 MMHG | BODY MASS INDEX: 27.59 KG/M2

## 2025-03-20 DIAGNOSIS — O20.0 THREATENED ABORTION: ICD-10-CM

## 2025-03-20 DIAGNOSIS — O03.9 SAB (SPONTANEOUS ABORTION): Primary | ICD-10-CM

## 2025-03-20 DIAGNOSIS — N96 HISTORY OF RECURRENT MISCARRIAGES: ICD-10-CM

## 2025-03-20 LAB — HCG INTACT+B SERPL-ACNC: 5.4 MIU/ML

## 2025-03-20 PROCEDURE — 84702 CHORIONIC GONADOTROPIN TEST: CPT | Performed by: OBSTETRICS & GYNECOLOGY

## 2025-03-20 PROCEDURE — 99213 OFFICE O/P EST LOW 20 MIN: CPT | Mod: S$PBB,,, | Performed by: OBSTETRICS & GYNECOLOGY

## 2025-03-20 PROCEDURE — 76801 OB US < 14 WKS SINGLE FETUS: CPT | Mod: TC,PN

## 2025-03-20 PROCEDURE — 36415 COLL VENOUS BLD VENIPUNCTURE: CPT | Performed by: OBSTETRICS & GYNECOLOGY

## 2025-03-20 PROCEDURE — 99999 PR PBB SHADOW E&M-EST. PATIENT-LVL III: CPT | Mod: PBBFAC,,, | Performed by: OBSTETRICS & GYNECOLOGY

## 2025-03-20 PROCEDURE — 99213 OFFICE O/P EST LOW 20 MIN: CPT | Mod: PBBFAC,25,PO | Performed by: OBSTETRICS & GYNECOLOGY

## 2025-03-20 NOTE — PROGRESS NOTES
CC: positive pregnancy test     HPI:   Mela Bear 25 y.o.  at [employer]  is here for + UPT. She had bleeding that was heavy. Has slowed down.     OB history: 2 ; 1 early SAB     Patient's last menstrual period was 2025 (exact date).     Past Medical History:   Diagnosis Date    Anemia, unspecified     Iron deficiency anemia, unspecified     Migraines        No past surgical history on file.    Family History   Problem Relation Name Age of Onset    Miscarriages / Stillbirths Maternal Grandmother      Hyperlipidemia Maternal Grandmother      Hypertension Maternal Grandmother      Diabetes Maternal Grandfather      No Known Problems Father Issac Nguyen     No Known Problems Mother Mariluz         uterine fibroids; prediabetes    Diabetes type II Sister Alisa Nguyen     Breast cancer Neg Hx      Colon cancer Neg Hx      Ovarian cancer Neg Hx      Cervical cancer Neg Hx      Uterine cancer Neg Hx         Social History[1]    OB History          4    Para   2    Term   2       0    AB   1    Living   2         SAB   1    IAB   0    Ectopic   0    Multiple   0    Live Births   2                  COMPREHENSIVE GYN HISTORY:  PAP History: has abnormal Paps.2022 LSIL  Infection History: Denies STDs. Denies PID.  Benign History: Denies uterine fibroids. Denies ovarian cysts. Denies endometriosis.   Cancer History: Denies cervical cancer. Denies uterine cancer or hyperplasia. Denies ovarian cancer. Denies vulvar cancer or pre-cancer. Denies vaginal cancer or pre-cancer. Denies breast cancer. Denies colon cancer.  Sexual Activity History:   reports being sexually active and has had partner(s) who are male. She reports using the following method of birth control/protection: None.   Menstrual History: nl age/monthly       ROS:  Negative   PE:   LMP 2025 (Exact Date)     APPEARANCE: Well nourished, well developed, in no acute distress.    PELVIC:   VULVA: No lesions. Normal female genitalia.  URETHRAL  MEATUS: Normal size and location, no lesions, no prolapse.  URETHRA: No masses, tenderness, prolapse or scarring.  VAGINA: normal   CERVIX: No lesions and discharge. No CMT, closed   UTERUS: nrmal size, regular shape, mobile, non-tender, bladder base nontender.  ADNEXA: No masses or tenderness.    HCG: 3/11/25 146->113 3/25     1. SAB (spontaneous )        Plan:    1.  Likely biochemical pregnancy/pregnancy unknown location. Will follow HCG weekly down to zero. Second SAB, would like some work up. Declines chromosome testing and HSG for now but would like antiphospholipid ab testing.     Face to Face time with patient: 20 minutes of total time spent on the encounter, which includes face to face time and non-face to face time preparing to see the patient (eg, review of tests), Obtaining and/or reviewing separately obtained history, Documenting clinical information in the electronic or other health record, Independently interpreting results (not separately reported) and communicating results to the patient/family/caregiver, or Care coordination (not separately reported).         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     Social Drivers of Health     Financial Resource Strain: High Risk (2025)    Overall Financial Resource Strain (CARDIA)     Difficulty of Paying Living Expenses: Very hard   Food  Insecurity: Food Insecurity Present (2/19/2025)    Hunger Vital Sign     Worried About Running Out of Food in the Last Year: Sometimes true     Ran Out of Food in the Last Year: Sometimes true   Transportation Needs: No Transportation Needs (2/19/2025)    PRAPARE - Transportation     Lack of Transportation (Medical): No     Lack of Transportation (Non-Medical): No   Physical Activity: Sufficiently Active (2/19/2025)    Exercise Vital Sign     Days of Exercise per Week: 4 days     Minutes of Exercise per Session: 150+ min   Stress: Stress Concern Present (2/19/2025)    [employer] [place] [employer] - Occupational Stress Questionnaire     Feeling of Stress : To some extent   Housing Stability: High Risk (2/19/2025)    Housing Stability Vital Sign     Unable to Pay for Housing in the Last Year: Yes     Number of Times Moved in the Last Year: 0     Homeless in the Last Year: No

## 2025-03-24 ENCOUNTER — PATIENT MESSAGE (OUTPATIENT)
Dept: FAMILY MEDICINE | Facility: CLINIC | Age: 25
End: 2025-03-24

## 2025-03-24 DIAGNOSIS — Z01.84 IMMUNITY STATUS TESTING: Primary | ICD-10-CM

## 2025-03-27 ENCOUNTER — LAB VISIT (OUTPATIENT)
Dept: LAB | Facility: HOSPITAL | Age: 25
End: 2025-03-27
Attending: STUDENT IN AN ORGANIZED HEALTH CARE EDUCATION/TRAINING PROGRAM

## 2025-03-27 DIAGNOSIS — Z01.84 IMMUNITY STATUS TESTING: ICD-10-CM

## 2025-03-27 DIAGNOSIS — O03.9 SAB (SPONTANEOUS ABORTION): ICD-10-CM

## 2025-03-27 DIAGNOSIS — N96 HISTORY OF RECURRENT MISCARRIAGES: ICD-10-CM

## 2025-03-27 LAB — HCG INTACT+B SERPL-ACNC: <2.4 MIU/ML

## 2025-03-27 PROCEDURE — 86762 RUBELLA ANTIBODY: CPT

## 2025-03-27 PROCEDURE — 86765 RUBEOLA ANTIBODY: CPT

## 2025-03-27 PROCEDURE — 86735 MUMPS ANTIBODY: CPT

## 2025-03-27 PROCEDURE — 84702 CHORIONIC GONADOTROPIN TEST: CPT

## 2025-03-27 PROCEDURE — 86147 CARDIOLIPIN ANTIBODY EA IG: CPT

## 2025-03-27 PROCEDURE — 36415 COLL VENOUS BLD VENIPUNCTURE: CPT | Mod: PO

## 2025-03-27 PROCEDURE — 86787 VARICELLA-ZOSTER ANTIBODY: CPT

## 2025-03-27 PROCEDURE — 86146 BETA-2 GLYCOPROTEIN ANTIBODY: CPT

## 2025-03-27 PROCEDURE — 85613 RUSSELL VIPER VENOM DILUTED: CPT

## 2025-03-27 PROCEDURE — 86706 HEP B SURFACE ANTIBODY: CPT

## 2025-03-28 ENCOUNTER — PATIENT MESSAGE (OUTPATIENT)
Dept: OBSTETRICS AND GYNECOLOGY | Facility: CLINIC | Age: 25
End: 2025-03-28

## 2025-03-28 LAB
MUMPS IGG (OHS): <5 AU/ML
MUMPS IGG INTERPRETATION (OHS): NEGATIVE
RUBEOLA (MEASLES) IGG (OHS): 18.1 AU/ML
RUBEOLA IGG INTERP. (OHS): POSITIVE
RUBV IGG SER-ACNC: <5 IU/ML
RUBV IGG SER-IMP: ABNORMAL
V.ZOSTER IGG INTERP (OHS): POSITIVE
VARICELLA ZOSTER IGG (OHS): 2.44 S/CO

## 2025-03-31 ENCOUNTER — PATIENT MESSAGE (OUTPATIENT)
Dept: FAMILY MEDICINE | Facility: CLINIC | Age: 25
End: 2025-03-31

## 2025-03-31 LAB
MIXING STUDIES PPP-IMP: NORMAL
SCREEN DRVVT/NORMAL: 0.98 RATIO

## 2025-04-01 ENCOUNTER — RESULTS FOLLOW-UP (OUTPATIENT)
Dept: FAMILY MEDICINE | Facility: CLINIC | Age: 25
End: 2025-04-01

## 2025-04-01 ENCOUNTER — TELEPHONE (OUTPATIENT)
Dept: FAMILY MEDICINE | Facility: CLINIC | Age: 25
End: 2025-04-01

## 2025-04-02 NOTE — TELEPHONE ENCOUNTER
Called and pt confirmed date of birth. Apologized for delay in interpretation of her titers. She would need repeat MMR. Hepatitis B still pending but should be back by the end of the week along with other pending labs - she recently had two back to back miscarriages and is undergoing evaluation with Dr. Bowen for a cause. She is about 6 weeks out from her out of state program so knows if she needs an immunization there will be a wait before she can get titers again. She will go on her portal and reschedule her upcoming follow up since she has a prior engagement at that time. Grateful for the call. No further questions.

## 2025-04-08 ENCOUNTER — OFFICE VISIT (OUTPATIENT)
Dept: FAMILY MEDICINE | Facility: CLINIC | Age: 25
End: 2025-04-08
Payer: MEDICAID

## 2025-04-08 VITALS
TEMPERATURE: 98 F | WEIGHT: 162.06 LBS | BODY MASS INDEX: 27.67 KG/M2 | DIASTOLIC BLOOD PRESSURE: 80 MMHG | OXYGEN SATURATION: 100 % | SYSTOLIC BLOOD PRESSURE: 102 MMHG | HEIGHT: 64 IN | HEART RATE: 67 BPM

## 2025-04-08 DIAGNOSIS — Z23 NEED FOR IMMUNIZATION AGAINST RUBELLA AND MUMPS: ICD-10-CM

## 2025-04-08 DIAGNOSIS — E66.3 OVERWEIGHT WITH BODY MASS INDEX (BMI) OF 27 TO 27.9 IN ADULT: Primary | ICD-10-CM

## 2025-04-08 PROBLEM — O09.899 SHORT INTERVAL BETWEEN PREGNANCIES AFFECTING PREGNANCY, ANTEPARTUM: Status: RESOLVED | Noted: 2024-12-10 | Resolved: 2025-04-08

## 2025-04-08 PROCEDURE — 99999 PR PBB SHADOW E&M-EST. PATIENT-LVL III: CPT | Mod: PBBFAC,,, | Performed by: NURSE PRACTITIONER

## 2025-04-08 PROCEDURE — 90471 IMMUNIZATION ADMIN: CPT | Mod: PBBFAC,PO

## 2025-04-08 PROCEDURE — 99213 OFFICE O/P EST LOW 20 MIN: CPT | Mod: PBBFAC,PO | Performed by: NURSE PRACTITIONER

## 2025-04-08 PROCEDURE — 99999PBSHW PR PBB SHADOW TECHNICAL ONLY FILED TO HB: Mod: PBBFAC,,,

## 2025-04-08 PROCEDURE — 90707 MMR VACCINE SC: CPT | Mod: PBBFAC,PO

## 2025-04-08 RX ORDER — BUPROPION HYDROCHLORIDE 150 MG/1
150 TABLET ORAL DAILY
Qty: 30 TABLET | Refills: 1 | Status: SHIPPED | OUTPATIENT
Start: 2025-04-08 | End: 2026-04-08

## 2025-04-08 RX ORDER — NALTREXONE HYDROCHLORIDE 50 MG/1
TABLET, FILM COATED ORAL
Qty: 15 TABLET | Refills: 1 | Status: SHIPPED | OUTPATIENT
Start: 2025-04-08

## 2025-04-08 RX ADMIN — MEASLES, MUMPS, AND RUBELLA VIRUS VACCINE LIVE 0.5 ML: 1000; 12500; 1000 INJECTION, POWDER, LYOPHILIZED, FOR SUSPENSION SUBCUTANEOUS at 02:04

## 2025-04-08 NOTE — PROGRESS NOTES
"Subjective     Patient ID: Mela Bear is a 25 y.o. female.    Chief Complaint: Immunizations (Discuss weight. )      HPI      Patient is new to me. PCP is Dr. Marie.    24 y/o  F with migraine, ADHD, MAAME presents to clinic to see about getting another MMR. She needs to show proof of immunity for school. She reports having a MMR booster in 2020 but it is not on her LINKS. She reports she never did seroconvert after that dose. She has immunity to rubeola but not to mumps or rubella. Her Hep B titer is still pending. She also reports struggling to lose weight despite exercise and dieting. She is a  and is very active. She is eating in a 500 calorie deficit. She is also struggling with focus and attention. She did not meet criteria for ADHD in her psych eval. She is in school for sonography. She is not pregnant, LMP 3 weeks ago.    Review of Systems   All other systems reviewed and are negative.         Objective   Vitals:    04/08/25 1338   BP: 102/80   BP Location: Left arm   Patient Position: Sitting   Pulse: 67   Temp: 97.9 °F (36.6 °C)   TempSrc: Oral   SpO2: 100%   Weight: 73.5 kg (162 lb 0.6 oz)   Height: 5' 4" (1.626 m)      Physical Exam  Constitutional:       General: She is not in acute distress.     Appearance: Normal appearance. She is not ill-appearing, toxic-appearing or diaphoretic.   HENT:      Head: Normocephalic and atraumatic.   Pulmonary:      Effort: No respiratory distress.   Neurological:      General: No focal deficit present.      Mental Status: She is alert and oriented to person, place, and time.   Psychiatric:         Mood and Affect: Mood normal.         Behavior: Behavior normal.         Thought Content: Thought content normal.         Judgment: Judgment normal.         1. Overweight with body mass index (BMI) of 27 to 27.9 in adult  - buPROPion (WELLBUTRIN XL) 150 MG TB24 tablet; Take 1 tablet (150 mg total) by mouth once daily.  Dispense: 30 tablet; Refill: 1  - " naltrexone (DEPADE) 50 mg tablet; Take 1/2 tablet po daily  Dispense: 15 tablet; Refill: 1    2. Need for immunization against rubella and mumps  - measles, mumps and rubella vaccine 1,000-12,500 TCID50/0.5 mL injection 0.5 mL  - Rubella antibody, IgG; Future  - Mumps, IgG Screen; Future   -redraw titers in 4 weeks    If Hep B titer is not back by Friday, will have her redraw.       RTC/ER precautions given. F/U 1 mo with PCP.    Counseled on regular exercise, maintenance of a healthy weight, balanced diet rich in fruits/vegetables and lean protein, and avoidance of unhealthy habits like smoking and excessive alcohol intake.    Gwen Luo, [id number]

## 2025-04-09 ENCOUNTER — PATIENT MESSAGE (OUTPATIENT)
Dept: OBSTETRICS AND GYNECOLOGY | Facility: CLINIC | Age: 25
End: 2025-04-09
Payer: MEDICAID

## 2025-04-09 ENCOUNTER — TELEPHONE (OUTPATIENT)
Dept: FAMILY MEDICINE | Facility: CLINIC | Age: 25
End: 2025-04-09
Payer: MEDICAID

## 2025-04-09 DIAGNOSIS — Z23 IMMUNIZATION DUE: Primary | ICD-10-CM

## 2025-04-09 DIAGNOSIS — Z02.0 SCHOOL PHYSICAL EXAM: Primary | ICD-10-CM

## 2025-04-10 ENCOUNTER — TELEPHONE (OUTPATIENT)
Dept: FAMILY MEDICINE | Facility: CLINIC | Age: 25
End: 2025-04-10
Payer: MEDICAID

## 2025-04-10 ENCOUNTER — PATIENT MESSAGE (OUTPATIENT)
Dept: FAMILY MEDICINE | Facility: CLINIC | Age: 25
End: 2025-04-10

## 2025-04-10 ENCOUNTER — CLINICAL SUPPORT (OUTPATIENT)
Dept: FAMILY MEDICINE | Facility: CLINIC | Age: 25
End: 2025-04-10
Payer: MEDICAID

## 2025-04-10 DIAGNOSIS — Z23 IMMUNIZATION DUE: Primary | ICD-10-CM

## 2025-04-10 PROCEDURE — 99999 PR PBB SHADOW E&M-EST. PATIENT-LVL I: CPT | Mod: PBBFAC,,,

## 2025-04-10 PROCEDURE — 90471 IMMUNIZATION ADMIN: CPT | Mod: PBBFAC,PO

## 2025-04-10 PROCEDURE — 99211 OFF/OP EST MAY X REQ PHY/QHP: CPT | Mod: S$PBB,,, | Performed by: STUDENT IN AN ORGANIZED HEALTH CARE EDUCATION/TRAINING PROGRAM

## 2025-04-10 PROCEDURE — 90746 HEPB VACCINE 3 DOSE ADULT IM: CPT | Mod: PBBFAC,PO

## 2025-04-10 PROCEDURE — 99999PBSHW PR PBB SHADOW TECHNICAL ONLY FILED TO HB: Mod: PBBFAC,,,

## 2025-04-10 PROCEDURE — 99211 OFF/OP EST MAY X REQ PHY/QHP: CPT | Mod: PBBFAC,PO

## 2025-04-10 RX ADMIN — HEPATITIS B VACCINE (RECOMBINANT) 20 MCG: 20 INJECTION, SUSPENSION INTRAMUSCULAR at 02:04

## 2025-04-10 NOTE — PROGRESS NOTES
After obtaining consent, and per orders of Dr. Lilia robin, injection of hepatitis B vaccine given by Adebayo Lynn. Patient instructed to remain in clinic for 20 minutes afterwards, and to report any adverse reaction to me immediately.   Pt. Ej.

## 2025-04-10 NOTE — TELEPHONE ENCOUNTER
----- Message from Med Assistant Hope sent at 4/9/2025  5:54 PM CDT -----  Type:  Appointment Request  Name of Caller:pt When is the first available appointment?No accessWould the patient rather a call back or a response via [facility]? Call Best Call Back Number: [phone] Information: Pt needs vaccination. Pt states she was seen yesterday but failed to get one of her required vaccinations. Please give pt a call back to get this scheduled or discuss further.

## 2025-04-15 ENCOUNTER — DOCUMENTATION ONLY (OUTPATIENT)
Dept: FAMILY MEDICINE | Facility: CLINIC | Age: 25
End: 2025-04-15
Payer: MEDICAID

## 2025-04-15 NOTE — PROGRESS NOTES
Denied    USHA GALLAGHER (Key: [id number])  PA Case ID #: [id number]  Need Help? Call us at [phone]  Status  Sent to Plan today  Drug  Naltrexone HCl 50MG tablets    Form  [employer] [place] Medicaid [employer] Electronic [place] Form (2017 NCPDP)    USHA GALLAGHER (Key: [id number])  PA Case ID #: [id number]  Need Help? Call us at [phone]  Outcome  Denied on April 15 by [facility] [employer] [place] Medicaid 2017  Denial: Admin: Plan Benefit Exclusion  Drug  Naltrexone HCl 50MG tablets    Form  [employer] [place] Medicaid [employer] Electronic PA Form (2017 NCPDP)

## 2025-04-24 ENCOUNTER — LAB VISIT (OUTPATIENT)
Dept: LAB | Facility: HOSPITAL | Age: 25
End: 2025-04-24
Attending: NURSE PRACTITIONER
Payer: MEDICAID

## 2025-04-24 ENCOUNTER — PATIENT MESSAGE (OUTPATIENT)
Dept: FAMILY MEDICINE | Facility: CLINIC | Age: 25
End: 2025-04-24
Payer: MEDICAID

## 2025-04-24 DIAGNOSIS — Z23 NEED FOR IMMUNIZATION AGAINST RUBELLA AND MUMPS: ICD-10-CM

## 2025-04-24 DIAGNOSIS — Z02.0 SCHOOL PHYSICAL EXAM: ICD-10-CM

## 2025-04-24 PROCEDURE — 86762 RUBELLA ANTIBODY: CPT

## 2025-04-24 PROCEDURE — 86735 MUMPS ANTIBODY: CPT

## 2025-04-24 PROCEDURE — 86706 HEP B SURFACE ANTIBODY: CPT | Mod: PO

## 2025-04-24 PROCEDURE — 36415 COLL VENOUS BLD VENIPUNCTURE: CPT | Mod: PO

## 2025-04-25 ENCOUNTER — RESULTS FOLLOW-UP (OUTPATIENT)
Dept: FAMILY MEDICINE | Facility: CLINIC | Age: 25
End: 2025-04-25

## 2025-04-25 LAB
MUMPS IGG (OHS): 117 AU/ML
MUMPS IGG INTERPRETATION (OHS): POSITIVE
RUBV IGG SER-ACNC: 130 IU/ML
RUBV IGG SER-IMP: REACTIVE

## 2025-04-28 LAB
W HEPATITIS B SURFACE ANTIBODY, QUALITATIVE: POSITIVE
W HEPATITIS B SURFACE ANTIBODY, QUANTITATIVE: NORMAL MIU/ML

## 2025-05-13 ENCOUNTER — PATIENT MESSAGE (OUTPATIENT)
Dept: FAMILY MEDICINE | Facility: CLINIC | Age: 25
End: 2025-05-13
Payer: MEDICAID

## 2025-05-19 ENCOUNTER — PATIENT MESSAGE (OUTPATIENT)
Dept: FAMILY MEDICINE | Facility: CLINIC | Age: 25
End: 2025-05-19
Payer: MEDICAID

## 2025-05-19 ENCOUNTER — PATIENT MESSAGE (OUTPATIENT)
Dept: OBSTETRICS AND GYNECOLOGY | Facility: CLINIC | Age: 25
End: 2025-05-19
Payer: MEDICAID

## 2025-05-20 NOTE — TELEPHONE ENCOUNTER
Spoke to pt. DR. De Jesus's office could have got her in today but pt couldn't make it also did not want to see him. I told her I would send a message to you but I did let her know that you are out of the office. Pt gave verbal understanding. She can only make Thursdays and we don't really have anything available.

## 2025-05-29 ENCOUNTER — OFFICE VISIT (OUTPATIENT)
Dept: URGENT CARE | Facility: CLINIC | Age: 25
End: 2025-05-29
Payer: MEDICAID

## 2025-05-29 VITALS
RESPIRATION RATE: 16 BRPM | SYSTOLIC BLOOD PRESSURE: 116 MMHG | OXYGEN SATURATION: 100 % | TEMPERATURE: 98 F | HEART RATE: 64 BPM | DIASTOLIC BLOOD PRESSURE: 76 MMHG

## 2025-05-29 DIAGNOSIS — R09.81 SINUS CONGESTION: Primary | ICD-10-CM

## 2025-05-29 LAB
CTP QC/QA: YES
CTP QC/QA: YES
POC MOLECULAR INFLUENZA A AGN: NEGATIVE
POC MOLECULAR INFLUENZA B AGN: NEGATIVE
SARS-COV+SARS-COV-2 AG RESP QL IA.RAPID: NORMAL

## 2025-05-29 PROCEDURE — 87502 INFLUENZA DNA AMP PROBE: CPT | Mod: QW,S$GLB,, | Performed by: NURSE PRACTITIONER

## 2025-05-29 RX ORDER — FLUTICASONE PROPIONATE 50 MCG
1 SPRAY, SUSPENSION (ML) NASAL DAILY
Qty: 9.9 ML | Refills: 0 | Status: SHIPPED | OUTPATIENT
Start: 2025-05-29

## 2025-05-29 NOTE — PROGRESS NOTES
Subjective:      Patient ID: Mela Bear is a 25 y.o. female.    Vitals:  temperature is 98.2 °F (36.8 °C). Her blood pressure is 116/76 and her pulse is 64. Her respiration is 16 and oxygen saturation is 100%.     Chief Complaint: Sinus Problem    Pt presents with sinus manoj, PND, headache, bodyache, cough x 3 days  No fever    Sinus Problem  This is a new problem. The current episode started in the past 7 days. The problem has been gradually worsening since onset. There has been no fever. She is experiencing no pain. Associated symptoms include congestion, coughing, headaches and sinus pressure. Treatments tried: ibuprofen. The treatment provided mild relief.       HENT:  Positive for congestion and sinus pressure.    Respiratory:  Positive for cough.    Neurological:  Positive for headaches.      Objective:     Physical Exam   Constitutional: She is oriented to person, place, and time. She appears well-developed. She is cooperative.  Non-toxic appearance. She does not appear ill. No distress.   HENT:   Head: Normocephalic and atraumatic.   Ears:   Right Ear: Hearing, tympanic membrane, external ear and ear canal normal.   Left Ear: Hearing, tympanic membrane, external ear and ear canal normal.   Nose: Nose normal. No mucosal edema, rhinorrhea or nasal deformity. No epistaxis. Right sinus exhibits no maxillary sinus tenderness and no frontal sinus tenderness. Left sinus exhibits no maxillary sinus tenderness and no frontal sinus tenderness.   Mouth/Throat: Uvula is midline, oropharynx is clear and moist and mucous membranes are normal. No trismus in the jaw. Normal dentition. No uvula swelling. No oropharyngeal exudate, posterior oropharyngeal edema or posterior oropharyngeal erythema.   Eyes: Conjunctivae and lids are normal. No scleral icterus.   Neck: Trachea normal and phonation normal. Neck supple. No edema present. No erythema present. No neck rigidity present.   Cardiovascular: Normal rate, regular rhythm,  normal heart sounds and normal pulses.   Pulmonary/Chest: Effort normal and breath sounds normal. No respiratory distress. She has no decreased breath sounds. She has no rhonchi.   Abdominal: Normal appearance.   Musculoskeletal: Normal range of motion.         General: No deformity. Normal range of motion.   Neurological: She is alert and oriented to person, place, and time. She exhibits normal muscle tone. Coordination normal.   Skin: Skin is warm, dry, intact, not diaphoretic and not pale.   Psychiatric: Her speech is normal and behavior is normal. Judgment and thought content normal.   Nursing note and vitals reviewed.      Assessment:     1. Sinus congestion        Plan:       Results for orders placed or performed in visit on 05/29/25   SARS Coronavirus 2 Antigen, POCT Manual Read    Collection Time: 05/29/25  3:45 PM   Result Value Ref Range    SARS Coronavirus 2 Antigen Presumptive Negative Negative, Presumptive Negative     Acceptable Yes    POCT Influenza A/B MOLECULAR    Collection Time: 05/29/25  4:09 PM   Result Value Ref Range    POC Molecular Influenza A Ag Negative Negative    POC Molecular Influenza B Ag Negative Negative     Acceptable Yes      - Discussed ddx, home care, tx options, and given follow up precautions. I have reviewed the patient's chart to view previous visits, labs, and imaging to assess PMH and look for any trends or previous treatments.      Sinus congestion  -     SARS Coronavirus 2 Antigen, POCT Manual Read  -     POCT Influenza A/B MOLECULAR  -     fluticasone propionate (FLONASE) 50 mcg/actuation nasal spray; 1 spray (50 mcg total) by Each Nostril route once daily.  Dispense: 9.9 mL; Refill: 0      Patient Instructions   A cold is caused by a virus that can settle in your nose, throat or lungs. This causes  a runny or stuffy nose and sneezing. You may also have a sore throat, cough, headache, fever and muscle aches. Different cold viruses last  different lengths  of time, but the average time is 2 to 14 days.    Seek immediate medical care if you develop fever, chest pain, or shortness of breath.     Treatment  There is no cure for the common cold.     Antibiotics may be used to treat signs of a secondary infection, but they do not treat  the cold virus. Try these tips to  keep yourself comfortable:  -Get plenty of rest.  -Drink plenty of fluids, at least 8 large glasses of fluid a day. Good fluidchoices are water, fruit juices high in Vitamin C, tea, gelatin, or broths and soups. These help to keep mucus thin and ease congestion.  -Use salt water gargle, cough drops or throat sprays to relieve throat pain. Mi ¼ to ½ teaspoon of salt in 1 cup of warm water for a salt water gargle  solution.  -Use petroleum jelly or lip balm around lips and nose to prevent chapping.  -Use saline nose drops or spray to help ease congestion.    Over the Counter (OTC) Medicines:  Take over the counter medicines as needed to ease your signs.  Read labels carefully.  Use a product that treats only the signs that you have. Ask your pharmacist  for recommendations. Be sure to ask about possible interactions with other  medicines you are taking.  Common medicines used to treat signs of a cold include:    #Antihistamines that dry secretions in your nose and lungs. Some of these  may cause you to feel drowsy. Talk to your pharmacist before use if you  have glaucoma or an enlarged prostate.  Names of some medicines in this group include:  - Diphenhydramine  - Brompheniramine  - Chlorpheniramine  - Clemastine    # Decongestants that tighten blood vessels in your nose to decrease  stuffiness and pressure. Use nasal spray decongestants for up to three days  only. Longer use can make congestion worse. Talk to your pharmacist  before use if you have high blood pressure, heart disease, diabetes or an  enlarged prostate.    Names of some medicines in this group include:  - Pseudoephedrine  (Sudafed)- kept behind the counter and requires identification  to purchase in limited quantities because it can be used to make illegal  drugs  - Phenylephrine  - Oxymetazoline nasal spray (Afrin)  - Cough suppressant, also called antitussive, such as dextromethorphan.  This medicine decreases your reflex and sensitivity to cough. This  medicine may be kept behind the pharmacy counter for purchase.  - Expectorant, sometimes called mucolytic, such as guaifenesin (mucinex). This  medicine thins mucus secretions in the lungs to make it easier for you to  cough up and out. (Be sure to drink plenty of fluids when taking this medication)  Cold and cough medicines often contain more than one type of medicine.  Ask the pharmacist for help to confirm that you are not using more than one  product with the same or similar ingredient. For example, some cold and  cough medicines have acetaminophen or ibuprofen in them to help lower a  fever or ease muscle aches. Do not take extra acetaminophen (Tylenol) or  ibuprofen (Advil, Motrin) if the cold or cough medicine has it as an  ingredient. Too much medicine could be harmful.    Take the correct dose as listed on the package. Do not take more than  recommended.    Use a Humidifier:  A cool mist humidifier can make breathing easier by thinning mucus. Do not use  a steam humidifier as hot water can cause burns if spilled.  Place the humidifier a few feet from the bed. Drain and clean each day with  soap and water to prevent bacteria and mold from growing.  Indoor humidity should not be above 50%. Stop using the humidifier if you  notice moisture on windows, walls or pictures.  You do not need to add any medicine to the humidifier.  If you cannot get a humidifier, place a pan of water next to heating vents and  refill the water level daily. The water will evaporate and add moisture to the  Room.    How to prevent the spread of colds  -Wash your hands with soap and water or use  alcohol based hand   often. Dry hands wet from washing with soap on a paper towel instead of cloth towel.  -Cough or sneeze into your elbow to avoid spreading germs.  -Wipe down common surfaces, such as door knobs and faucet handles, with a disinfectant spray.  -Do not share cups or utensils.

## 2025-05-29 NOTE — LETTER
May 29, 2025      [facility]  [address] , [address]  [place] [address]  Phone: [phone]  Fax: [phone]       Patient: Mela Bear   YOB: 2000  Date of Visit: 05/29/2025    To Whom It May Concern:    Daly Bear  was at [facility] on 05/29/2025. Please excuse days missed. If you have any questions or concerns, or if I can be of further assistance, please do not hesitate to contact me.    Sincerely,    Deidre Lynch MA

## 2025-06-26 ENCOUNTER — PATIENT MESSAGE (OUTPATIENT)
Dept: FAMILY MEDICINE | Facility: CLINIC | Age: 25
End: 2025-06-26
Payer: MEDICAID

## 2025-06-27 ENCOUNTER — OFFICE VISIT (OUTPATIENT)
Dept: FAMILY MEDICINE | Facility: CLINIC | Age: 25
End: 2025-06-27
Payer: MEDICAID

## 2025-06-27 ENCOUNTER — PATIENT MESSAGE (OUTPATIENT)
Dept: FAMILY MEDICINE | Facility: CLINIC | Age: 25
End: 2025-06-27

## 2025-06-27 DIAGNOSIS — Z13.39 ADHD (ATTENTION DEFICIT HYPERACTIVITY DISORDER) EVALUATION: ICD-10-CM

## 2025-06-27 DIAGNOSIS — R41.840 DIFFICULTY CONCENTRATING: Primary | ICD-10-CM

## 2025-06-27 PROCEDURE — 1160F RVW MEDS BY RX/DR IN RCRD: CPT | Mod: CPTII,95,, | Performed by: STUDENT IN AN ORGANIZED HEALTH CARE EDUCATION/TRAINING PROGRAM

## 2025-06-27 PROCEDURE — 98005 SYNCH AUDIO-VIDEO EST LOW 20: CPT | Mod: 95,,, | Performed by: STUDENT IN AN ORGANIZED HEALTH CARE EDUCATION/TRAINING PROGRAM

## 2025-06-27 PROCEDURE — 1159F MED LIST DOCD IN RCRD: CPT | Mod: CPTII,95,, | Performed by: STUDENT IN AN ORGANIZED HEALTH CARE EDUCATION/TRAINING PROGRAM

## 2025-06-27 NOTE — PROGRESS NOTES
Subjective:       Patient ID: Mela Bear is a 25 y.o. female.    Chief Complaint: evaluation for ADHD    HPI  20 mins will come back in November for check up or sooner if needed  The patient location is: [place]  The chief complaint leading to consultation is: evaluation for ADHD    Visit type: audiovisual    Face to Face time with patient: 20 minutes  20 minutes of total time spent on the encounter, which includes face to face time and non-face to face time preparing to see the patient (eg, review of tests), Obtaining and/or reviewing separately obtained history, Documenting clinical information in the electronic or other health record, Independently interpreting results (not separately reported) and communicating results to the patient/family/caregiver, or Care coordination (not separately reported).         Each patient to whom he or she provides medical services by telemedicine is:  (1) informed of the relationship between the physician and patient and the respective role of any other health care provider with respect to management of the patient; and (2) notified that he or she may decline to receive medical services by telemedicine and may withdraw from such care at any time.    Notes:   25 year old female presents for audiovisual virtual visit. She has some underlying inattention and anxiety. She procrastinates and then becomes anxious about the list of to do's and then cannot concentrate to get anything done. Not often, but later on sometimes, she experiences feelings of depression. She talks out loud to herself in tests and has been previously accused of cheating. Now she has explained this to her instructors to avoid confusion during proctored exams. This first came about in childhood when evaluation for ADHD was recommended, but she never had it done. She underwent recent evaluation with mixed results - Rosy was negative, but peer and self evaluations were positive. She would like a second opinion. We will  refer to another provider, Dr. Morales, with consideration for ADHD evaluation, learning disability evaluation, possible underlying untreated anxiety.    Reviewed documentation from Dr. Huntley in February.  Wender [place] Rating Scale score of 46 supports ADHD diagnosis  [facility] Adult current symptoms (spouse) supports combined type ADHD  [facility] Adult childhood symptoms (mother) supports ADHD diagnosis  Josue Continuous Performance - does not support ADHD diagnosis  Dot Counting - good effort    Review of system positive for very heavy menstrual flow last cycle - most recent was improved. The patient will reach out to Dr. Bowen if that returns. It was the second cycle after a miscarriage, so this could be a contributing factor.    Refer to Dr. Morales, psychiatry, for second opinion, ADHD evaluation, possible learning disability testing.  Follow up for check up with me in November.    Past Medical History:   Diagnosis Date    Anemia, unspecified     Iron deficiency anemia, unspecified     Migraines        History reviewed. No pertinent surgical history.    Review of patient's allergies indicates:  No Known Allergies    Social History[1]    Medications Ordered Prior to Encounter[2]    Family History   Problem Relation Name Age of Onset    Miscarriages / Stillbirths Maternal Grandmother      Hyperlipidemia Maternal Grandmother      Hypertension Maternal Grandmother      Diabetes Maternal Grandfather      No Known Problems Father Issac Nguyen     No Known Problems Mother Quochandra         uterine fibroids; prediabetes    Diabetes type II Sister Alisa Nguyen     Breast cancer Neg Hx      Colon cancer Neg Hx      Ovarian cancer Neg Hx      Cervical cancer Neg Hx      Uterine cancer Neg Hx         Review of Systems   Constitutional:  Negative for activity change and unexpected weight change.   HENT:  Negative for hearing loss, rhinorrhea and trouble swallowing.    Eyes:  Negative for discharge and visual disturbance.    Respiratory:  Negative for chest tightness and wheezing.    Cardiovascular:  Negative for chest pain and palpitations.   Gastrointestinal:  Positive for constipation. Negative for blood in stool, diarrhea and vomiting.   Endocrine: Negative for polydipsia and polyuria.   Genitourinary:  Positive for menstrual problem. Negative for difficulty urinating, dysuria and hematuria.   Musculoskeletal:  Negative for arthralgias, joint swelling and neck pain.   Neurological:  Positive for headaches. Negative for weakness.   Psychiatric/Behavioral:  Positive for dysphoric mood. Negative for confusion.          Objective:      LMP 05/18/2025 (Exact Date)   Physical Exam    Assessment:       1. Difficulty concentrating    2. ADHD (attention deficit hyperactivity disorder) evaluation        Plan:       Difficulty concentrating  -     Ambulatory referral/consult to Psychiatry; Future; Expected date: 07/04/2025  - Currently coping with keeping good records and track of due dates.  - Refer for second opinion, possible testing for learning disability, underlying untreated anxiety.  - Reviewed documentation from Dr. Huntley, 2/19/25 for ADHD evaluation -    Wender [place] Rating Scale score of 46 supports ADHD diagnosis  [facility] Adult current symptoms (spouse) supports combined type ADHD  [facility] Adult childhood symptoms (mother) supports ADHD diagnosis  Josue Continuous Performance - does not support ADHD diagnosis  Dot Counting - good effort    ADHD (attention deficit hyperactivity disorder) evaluation  -     Ambulatory referral/consult to Psychiatry; Future; Expected date: 07/04/2025  - as above      Return for check up in November or sooner if needed.       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     Social Drivers of Health     Financial Resource Strain: High Risk (2/19/2025)    Overall Financial Resource Strain (CARDIA)     Difficulty of Paying Living Expenses: Very hard   Food Insecurity: Food Insecurity Present (2/19/2025)    Hunger Vital Sign     Worried About Running Out of Food in the Last Year: Sometimes true     Ran Out of Food in the Last Year: Sometimes true   Transportation Needs: No Transportation Needs (2/19/2025)    PRAPARE - Transportation     Lack of Transportation (Medical): No     Lack of Transportation (Non-Medical): No   Physical Activity: Sufficiently Active (2/19/2025)    Exercise Vital Sign     Days of Exercise per Week: 4 days     Minutes of Exercise per Session: 150+ min   Stress: Stress Concern Present (2/19/2025)    [employer] - Occupational Stress Questionnaire     Feeling of Stress : To some extent   Housing Stability: High Risk (2/19/2025)    Housing Stability Vital Sign     Unable to Pay for Housing in the Last Year: Yes     Number of Times Moved in the Last Year: 0     Homeless in the Last Year: No   [2]   Current Outpatient Medications on File Prior to Visit   Medication Sig Dispense Refill    buPROPion (WELLBUTRIN XL) 150 MG TB24 tablet Take 1 tablet (150 mg total) by mouth once daily. 30 tablet 1    ferrous sulfate 325 (65 FE) MG EC tablet Take 1 tablet (325 mg total) by mouth every other day. 60 tablet 3    fluticasone propionate (FLONASE) 50 mcg/actuation nasal spray 1 spray (50 mcg total) by Each Nostril route once daily. 9.9 mL 0    naltrexone (DEPADE) 50 mg tablet  Take 1/2 tablet po daily 15 tablet 1    prenatal no115/iron/folic acid (PRENATAL 19 ORAL) Take by mouth. (Patient not taking: Reported on 5/29/2025)      rizatriptan (MAXALT-MLT) 10 MG disintegrating tablet Take 1 tablet (10 mg total) by mouth as needed for Migraine. May repeat in 2 hours if needed 10 tablet 11     Current Facility-Administered Medications on File Prior to Visit   Medication Dose Route Frequency Provider Last Rate Last Admin    hepatitis B virus vacc.rec(PF) injection 20 mcg  20 mcg Intramuscular vaccine x 1 dose

## 2025-06-29 PROBLEM — R41.840 DIFFICULTY CONCENTRATING: Status: ACTIVE | Noted: 2025-06-29

## 2025-07-28 ENCOUNTER — PATIENT MESSAGE (OUTPATIENT)
Dept: FAMILY MEDICINE | Facility: CLINIC | Age: 25
End: 2025-07-28
Payer: MEDICAID

## 2025-07-28 DIAGNOSIS — E78.5 HYPERLIPIDEMIA, UNSPECIFIED HYPERLIPIDEMIA TYPE: Primary | ICD-10-CM

## 2025-08-03 DIAGNOSIS — D50.9 MICROCYTIC ANEMIA: ICD-10-CM

## 2025-08-03 DIAGNOSIS — E78.2 MIXED HYPERLIPIDEMIA: Primary | ICD-10-CM

## 2025-08-12 ENCOUNTER — PATIENT MESSAGE (OUTPATIENT)
Dept: FAMILY MEDICINE | Facility: CLINIC | Age: 25
End: 2025-08-12
Payer: MEDICAID

## 2025-08-19 ENCOUNTER — OFFICE VISIT (OUTPATIENT)
Dept: URGENT CARE | Facility: CLINIC | Age: 25
End: 2025-08-19
Payer: MEDICAID

## 2025-08-19 VITALS
RESPIRATION RATE: 18 BRPM | WEIGHT: 162 LBS | TEMPERATURE: 99 F | SYSTOLIC BLOOD PRESSURE: 116 MMHG | HEIGHT: 64 IN | HEART RATE: 100 BPM | DIASTOLIC BLOOD PRESSURE: 70 MMHG | BODY MASS INDEX: 27.66 KG/M2 | OXYGEN SATURATION: 97 %

## 2025-08-19 DIAGNOSIS — U07.1 COVID-19: Primary | ICD-10-CM

## 2025-08-19 DIAGNOSIS — J02.9 SORE THROAT: ICD-10-CM

## 2025-08-19 DIAGNOSIS — R05.9 COUGH, UNSPECIFIED TYPE: ICD-10-CM

## 2025-08-19 LAB
CTP QC/QA: YES
SARS-COV+SARS-COV-2 AG RESP QL IA.RAPID: POSITIVE

## 2025-08-19 PROCEDURE — 87811 SARS-COV-2 COVID19 W/OPTIC: CPT | Mod: QW,S$GLB,, | Performed by: NURSE PRACTITIONER

## 2025-08-19 PROCEDURE — 99214 OFFICE O/P EST MOD 30 MIN: CPT | Mod: S$GLB,,, | Performed by: NURSE PRACTITIONER

## 2025-08-19 RX ORDER — NIRMATRELVIR AND RITONAVIR 300-100 MG
KIT ORAL
Qty: 30 TABLET | Refills: 0 | Status: SHIPPED | OUTPATIENT
Start: 2025-08-19 | End: 2025-08-24

## 2025-08-19 RX ORDER — PROMETHAZINE HYDROCHLORIDE 6.25 MG/5ML
6.25 SYRUP ORAL NIGHTLY PRN
Qty: 120 ML | Refills: 0 | Status: SHIPPED | OUTPATIENT
Start: 2025-08-19

## 2025-08-19 RX ORDER — LIDOCAINE HYDROCHLORIDE 20 MG/ML
SOLUTION OROPHARYNGEAL EVERY 4 HOURS
Qty: 100 ML | Refills: 0 | Status: SHIPPED | OUTPATIENT
Start: 2025-08-19

## 2025-09-02 ENCOUNTER — PATIENT MESSAGE (OUTPATIENT)
Dept: OBSTETRICS AND GYNECOLOGY | Facility: CLINIC | Age: 25
End: 2025-09-02
Payer: MEDICAID